# Patient Record
Sex: MALE | Race: BLACK OR AFRICAN AMERICAN | NOT HISPANIC OR LATINO | Employment: OTHER | URBAN - METROPOLITAN AREA
[De-identification: names, ages, dates, MRNs, and addresses within clinical notes are randomized per-mention and may not be internally consistent; named-entity substitution may affect disease eponyms.]

---

## 2021-05-10 ENCOUNTER — PATIENT MESSAGE (OUTPATIENT)
Dept: RESEARCH | Facility: HOSPITAL | Age: 41
End: 2021-05-10

## 2021-12-07 ENCOUNTER — OFFICE VISIT (OUTPATIENT)
Dept: URGENT CARE | Facility: CLINIC | Age: 41
End: 2021-12-07
Payer: MEDICARE

## 2021-12-07 VITALS
SYSTOLIC BLOOD PRESSURE: 150 MMHG | RESPIRATION RATE: 17 BRPM | HEIGHT: 75 IN | BODY MASS INDEX: 26.73 KG/M2 | OXYGEN SATURATION: 97 % | DIASTOLIC BLOOD PRESSURE: 97 MMHG | TEMPERATURE: 98 F | HEART RATE: 85 BPM | WEIGHT: 215 LBS

## 2021-12-07 DIAGNOSIS — R05.9 COUGH: Primary | ICD-10-CM

## 2021-12-07 LAB
CTP QC/QA: YES
CTP QC/QA: YES
POC MOLECULAR INFLUENZA A AGN: NEGATIVE
POC MOLECULAR INFLUENZA B AGN: NEGATIVE
SARS-COV-2 RDRP RESP QL NAA+PROBE: NEGATIVE

## 2021-12-07 PROCEDURE — 87502 INFLUENZA DNA AMP PROBE: CPT | Mod: QW,S$GLB,, | Performed by: FAMILY MEDICINE

## 2021-12-07 PROCEDURE — U0002: ICD-10-PCS | Mod: QW,S$GLB,, | Performed by: FAMILY MEDICINE

## 2021-12-07 PROCEDURE — 99203 PR OFFICE/OUTPT VISIT, NEW, LEVL III, 30-44 MIN: ICD-10-PCS | Mod: S$GLB,CS,, | Performed by: FAMILY MEDICINE

## 2021-12-07 PROCEDURE — 4010F PR ACE/ARB THEARPY RXD/TAKEN: ICD-10-PCS | Mod: CPTII,S$GLB,, | Performed by: FAMILY MEDICINE

## 2021-12-07 PROCEDURE — U0002 COVID-19 LAB TEST NON-CDC: HCPCS | Mod: QW,S$GLB,, | Performed by: FAMILY MEDICINE

## 2021-12-07 PROCEDURE — 4010F ACE/ARB THERAPY RXD/TAKEN: CPT | Mod: CPTII,S$GLB,, | Performed by: FAMILY MEDICINE

## 2021-12-07 PROCEDURE — 99203 OFFICE O/P NEW LOW 30 MIN: CPT | Mod: S$GLB,CS,, | Performed by: FAMILY MEDICINE

## 2021-12-07 PROCEDURE — 87502 POCT INFLUENZA A/B MOLECULAR: ICD-10-PCS | Mod: QW,S$GLB,, | Performed by: FAMILY MEDICINE

## 2021-12-07 RX ORDER — PROMETHAZINE HYDROCHLORIDE 6.25 MG/5ML
SYRUP ORAL
Qty: 120 ML | Refills: 1 | Status: SHIPPED | OUTPATIENT
Start: 2021-12-07 | End: 2024-03-18

## 2021-12-07 RX ORDER — AMOXICILLIN AND CLAVULANATE POTASSIUM 875; 125 MG/1; MG/1
1 TABLET, FILM COATED ORAL 2 TIMES DAILY
Qty: 20 TABLET | Refills: 0 | Status: SHIPPED | OUTPATIENT
Start: 2021-12-07 | End: 2021-12-17

## 2021-12-07 RX ORDER — METHYLPREDNISOLONE 4 MG/1
TABLET ORAL
Qty: 1 EACH | Refills: 0 | Status: SHIPPED | OUTPATIENT
Start: 2021-12-07 | End: 2022-06-07

## 2022-06-07 ENCOUNTER — OFFICE VISIT (OUTPATIENT)
Dept: URGENT CARE | Facility: CLINIC | Age: 42
End: 2022-06-07
Payer: MEDICARE

## 2022-06-07 VITALS
RESPIRATION RATE: 15 BRPM | HEART RATE: 64 BPM | BODY MASS INDEX: 26.73 KG/M2 | SYSTOLIC BLOOD PRESSURE: 140 MMHG | TEMPERATURE: 98 F | WEIGHT: 215 LBS | DIASTOLIC BLOOD PRESSURE: 89 MMHG | HEIGHT: 75 IN | OXYGEN SATURATION: 94 %

## 2022-06-07 DIAGNOSIS — S44.92XA NEUROPRAXIA OF LEFT UPPER EXTREMITY, INITIAL ENCOUNTER: ICD-10-CM

## 2022-06-07 DIAGNOSIS — R20.0 LEFT ARM NUMBNESS: Primary | ICD-10-CM

## 2022-06-07 DIAGNOSIS — S16.1XXA STRAIN OF NECK MUSCLE, INITIAL ENCOUNTER: ICD-10-CM

## 2022-06-07 PROCEDURE — 3077F SYST BP >= 140 MM HG: CPT | Mod: CPTII,S$GLB,, | Performed by: EMERGENCY MEDICINE

## 2022-06-07 PROCEDURE — 3079F PR MOST RECENT DIASTOLIC BLOOD PRESSURE 80-89 MM HG: ICD-10-PCS | Mod: CPTII,S$GLB,, | Performed by: EMERGENCY MEDICINE

## 2022-06-07 PROCEDURE — 1160F RVW MEDS BY RX/DR IN RCRD: CPT | Mod: CPTII,S$GLB,, | Performed by: EMERGENCY MEDICINE

## 2022-06-07 PROCEDURE — 72040 X-RAY EXAM NECK SPINE 2-3 VW: CPT | Mod: S$GLB,,, | Performed by: RADIOLOGY

## 2022-06-07 PROCEDURE — 1160F PR REVIEW ALL MEDS BY PRESCRIBER/CLIN PHARMACIST DOCUMENTED: ICD-10-PCS | Mod: CPTII,S$GLB,, | Performed by: EMERGENCY MEDICINE

## 2022-06-07 PROCEDURE — 71046 XR CHEST PA AND LATERAL: ICD-10-PCS | Mod: S$GLB,,, | Performed by: RADIOLOGY

## 2022-06-07 PROCEDURE — 72040 XR CERVICAL SPINE 2 OR 3 VIEWS: ICD-10-PCS | Mod: S$GLB,,, | Performed by: RADIOLOGY

## 2022-06-07 PROCEDURE — 99214 PR OFFICE/OUTPT VISIT, EST, LEVL IV, 30-39 MIN: ICD-10-PCS | Mod: S$GLB,,, | Performed by: EMERGENCY MEDICINE

## 2022-06-07 PROCEDURE — 71046 X-RAY EXAM CHEST 2 VIEWS: CPT | Mod: S$GLB,,, | Performed by: RADIOLOGY

## 2022-06-07 PROCEDURE — 4010F ACE/ARB THERAPY RXD/TAKEN: CPT | Mod: CPTII,S$GLB,, | Performed by: EMERGENCY MEDICINE

## 2022-06-07 PROCEDURE — 3008F PR BODY MASS INDEX (BMI) DOCUMENTED: ICD-10-PCS | Mod: CPTII,S$GLB,, | Performed by: EMERGENCY MEDICINE

## 2022-06-07 PROCEDURE — 3079F DIAST BP 80-89 MM HG: CPT | Mod: CPTII,S$GLB,, | Performed by: EMERGENCY MEDICINE

## 2022-06-07 PROCEDURE — 99214 OFFICE O/P EST MOD 30 MIN: CPT | Mod: S$GLB,,, | Performed by: EMERGENCY MEDICINE

## 2022-06-07 PROCEDURE — 3008F BODY MASS INDEX DOCD: CPT | Mod: CPTII,S$GLB,, | Performed by: EMERGENCY MEDICINE

## 2022-06-07 PROCEDURE — 3077F PR MOST RECENT SYSTOLIC BLOOD PRESSURE >= 140 MM HG: ICD-10-PCS | Mod: CPTII,S$GLB,, | Performed by: EMERGENCY MEDICINE

## 2022-06-07 PROCEDURE — 4010F PR ACE/ARB THEARPY RXD/TAKEN: ICD-10-PCS | Mod: CPTII,S$GLB,, | Performed by: EMERGENCY MEDICINE

## 2022-06-07 PROCEDURE — 1159F PR MEDICATION LIST DOCUMENTED IN MEDICAL RECORD: ICD-10-PCS | Mod: CPTII,S$GLB,, | Performed by: EMERGENCY MEDICINE

## 2022-06-07 PROCEDURE — 1159F MED LIST DOCD IN RCRD: CPT | Mod: CPTII,S$GLB,, | Performed by: EMERGENCY MEDICINE

## 2022-06-07 RX ORDER — METHYLPREDNISOLONE 4 MG/1
TABLET ORAL
Qty: 21 EACH | Refills: 0 | Status: SHIPPED | OUTPATIENT
Start: 2022-06-07 | End: 2022-06-28

## 2022-06-07 RX ORDER — CYCLOBENZAPRINE HCL 10 MG
10 TABLET ORAL 3 TIMES DAILY PRN
Qty: 21 TABLET | Refills: 0 | Status: SHIPPED | OUTPATIENT
Start: 2022-06-07

## 2022-06-07 NOTE — LETTER
[facility]  Urgent Care  [address], [address]  [facility] [address]  Phone: [phone]  Fax: [phone] June 7, 2022    Patient: Singh Wan   Patient ID [id number]   YOB: 1980   Date of Visit: 6/7/2022       To Whom It May Concern:    Singh Wan was seen and treated in our Urgent Care on 6/7/2022. He may return to work on 6/9/2022.    Sincerely,       Aly Daley MD

## 2022-06-07 NOTE — PROGRESS NOTES
"Subjective:       Patient ID: Singh Wan is a 41 y.o. male.    Vitals:  height is 6' 3" (1.905 m) and weight is 97.5 kg (215 lb). His temperature is 97.9 °F (36.6 °C). His blood pressure is 140/89 (abnormal) and his pulse is 64. His respiration is 15 and oxygen saturation is 94% (abnormal).     Chief Complaint: Numbness    Patient woke up this morning with left arm numbness.  Initially he felt that the majority of his left arm was numb radiating all the way down to all finger tips.  He says that during the course of the morning the numbness is gradually resolving.  He says now he feels numbness only on the some and index and middle fingers.  He denies chest pain.  He denies shortness of breath.  He has no fever chills.  He has no loss of motor function.  He actually denies neck pain.  Symptoms started this morning. Numbness travels down left arm. Denies heart issues.     Other  This is a new problem. The current episode started today. The problem occurs constantly. The problem has been unchanged. Nothing aggravates the symptoms. He has tried nothing for the symptoms. The treatment provided no relief.     ROS    Objective:      Physical Exam   Constitutional: He is oriented to person, place, and time. He appears well-developed. He is cooperative. No distress.   HENT:   Head: Normocephalic and atraumatic.   Nose: Nose normal.   Mouth/Throat: Oropharynx is clear and moist and mucous membranes are normal. Mucous membranes are moist. Oropharynx is clear.   Eyes: Conjunctivae and lids are normal.   Neck: Trachea normal and phonation normal. Neck supple. No neck rigidity present.   Cardiovascular: Normal rate, regular rhythm, normal heart sounds and normal pulses.   No murmur heard.  Pulmonary/Chest: Effort normal and breath sounds normal. No respiratory distress. He has no wheezes. He has no rales.   Abdominal: Normal appearance and bowel sounds are normal. He exhibits no abdominal bruit, no pulsatile midline " mass and no mass. Soft.   Musculoskeletal:         General: No deformity.   Neurological: He is alert and oriented to person, place, and time. He has normal strength and normal reflexes. No sensory deficit.      Comments: Cranial nerves 2-12 are normal.  Motor function and strength in upper and lower extremities are normal.  There is no ataxia.  Patient has partial decrease in sensation subjectively in the radial distribution.   Skin: Skin is warm, dry, intact and not diaphoretic.   Psychiatric: His speech is normal and behavior is normal. Judgment and thought content normal.   Nursing note and vitals reviewed.      EKG reveals normal sinus rhythm, LVH, no acute changes.  41-year-old woke up with left arm numbness.  The numbness is improved during the course of the morning.  Chest x-ray reveals no masses.  Cervical spine x-ray reveals reversal lordotic curvature of the C-spine.   A 2nd repeat exam reveals good sensation in all areas of the left upper extremity including the hand.  Patient's symptoms are consistent with neurapraxia and cervical strain.  Will treat with muscle relaxant and steroid Dosepak.       Assessment:       1. Left arm numbness    2. Strain of neck muscle, initial encounter    3. Neuropraxia of left upper extremity, initial encounter          Plan:         Left arm numbness  -     XR CHEST PA AND LATERAL; Future; Expected date: 06/07/2022  -     XR Cervical Spine 2 or 3 Views; Future; Expected date: 06/07/2022    Strain of neck muscle, initial encounter  -     methylPREDNISolone (MEDROL DOSEPACK) 4 mg tablet; use as directed  Dispense: 21 each; Refill: 0  -     cyclobenzaprine (FLEXERIL) 10 MG tablet; Take 1 tablet (10 mg total) by mouth 3 (three) times daily as needed for Muscle spasms.  Dispense: 21 tablet; Refill: 0    Neuropraxia of left upper extremity, initial encounter

## 2022-07-11 ENCOUNTER — OFFICE VISIT (OUTPATIENT)
Dept: URGENT CARE | Facility: CLINIC | Age: 42
End: 2022-07-11
Payer: MEDICARE

## 2022-07-11 VITALS
WEIGHT: 215 LBS | DIASTOLIC BLOOD PRESSURE: 98 MMHG | HEART RATE: 66 BPM | RESPIRATION RATE: 16 BRPM | BODY MASS INDEX: 26.73 KG/M2 | HEIGHT: 75 IN | TEMPERATURE: 100 F | SYSTOLIC BLOOD PRESSURE: 153 MMHG | OXYGEN SATURATION: 98 %

## 2022-07-11 DIAGNOSIS — U07.1 COVID-19: ICD-10-CM

## 2022-07-11 DIAGNOSIS — R09.81 CONGESTION OF NASAL SINUS: Primary | ICD-10-CM

## 2022-07-11 DIAGNOSIS — U07.1 COVID-19 VIRUS DETECTED: ICD-10-CM

## 2022-07-11 LAB
CTP QC/QA: YES
SARS-COV-2 RDRP RESP QL NAA+PROBE: POSITIVE

## 2022-07-11 PROCEDURE — 3008F PR BODY MASS INDEX (BMI) DOCUMENTED: ICD-10-PCS | Mod: CPTII,S$GLB,, | Performed by: FAMILY MEDICINE

## 2022-07-11 PROCEDURE — U0002: ICD-10-PCS | Mod: QW,S$GLB,, | Performed by: FAMILY MEDICINE

## 2022-07-11 PROCEDURE — 4010F ACE/ARB THERAPY RXD/TAKEN: CPT | Mod: CPTII,S$GLB,, | Performed by: FAMILY MEDICINE

## 2022-07-11 PROCEDURE — 99214 PR OFFICE/OUTPT VISIT, EST, LEVL IV, 30-39 MIN: ICD-10-PCS | Mod: S$GLB,,, | Performed by: FAMILY MEDICINE

## 2022-07-11 PROCEDURE — 3077F PR MOST RECENT SYSTOLIC BLOOD PRESSURE >= 140 MM HG: ICD-10-PCS | Mod: CPTII,S$GLB,, | Performed by: FAMILY MEDICINE

## 2022-07-11 PROCEDURE — 3008F BODY MASS INDEX DOCD: CPT | Mod: CPTII,S$GLB,, | Performed by: FAMILY MEDICINE

## 2022-07-11 PROCEDURE — 4010F PR ACE/ARB THEARPY RXD/TAKEN: ICD-10-PCS | Mod: CPTII,S$GLB,, | Performed by: FAMILY MEDICINE

## 2022-07-11 PROCEDURE — 3080F PR MOST RECENT DIASTOLIC BLOOD PRESSURE >= 90 MM HG: ICD-10-PCS | Mod: CPTII,S$GLB,, | Performed by: FAMILY MEDICINE

## 2022-07-11 PROCEDURE — 3077F SYST BP >= 140 MM HG: CPT | Mod: CPTII,S$GLB,, | Performed by: FAMILY MEDICINE

## 2022-07-11 PROCEDURE — 3080F DIAST BP >= 90 MM HG: CPT | Mod: CPTII,S$GLB,, | Performed by: FAMILY MEDICINE

## 2022-07-11 PROCEDURE — 99214 OFFICE O/P EST MOD 30 MIN: CPT | Mod: S$GLB,,, | Performed by: FAMILY MEDICINE

## 2022-07-11 PROCEDURE — U0002 COVID-19 LAB TEST NON-CDC: HCPCS | Mod: QW,S$GLB,, | Performed by: FAMILY MEDICINE

## 2022-07-11 RX ORDER — PROMETHAZINE HYDROCHLORIDE 6.25 MG/5ML
SYRUP ORAL
Qty: 120 ML | Refills: 1 | Status: SHIPPED | OUTPATIENT
Start: 2022-07-11 | End: 2024-03-18

## 2022-07-11 RX ORDER — BENZONATATE 100 MG/1
100 CAPSULE ORAL EVERY 6 HOURS PRN
Qty: 30 CAPSULE | Refills: 1 | Status: SHIPPED | OUTPATIENT
Start: 2022-07-11 | End: 2023-07-11

## 2022-07-11 NOTE — PROGRESS NOTES
"Subjective:       Patient ID: Singh Wan is a 41 y.o. male.    Vitals:  height is 6' 3" (1.905 m) and weight is 97.5 kg (215 lb). His oral temperature is 99.5 °F (37.5 °C). His blood pressure is 153/98 (abnormal) and his pulse is 66. His respiration is 16 and oxygen saturation is 98%.     Chief Complaint: Nasal Congestion    Pt presents with body aches, nasal congestion, sore throat, weakness x2 days. Pt not Covid vacs and no known exposure. OTC not taken and pain scale 6 /10.    Other  This is a new problem. The current episode started in the past 7 days. The problem occurs constantly. The problem has been unchanged. Associated symptoms include congestion and fatigue. Nothing aggravates the symptoms. He has tried nothing for the symptoms.       Constitution: Positive for fatigue.   HENT: Positive for congestion.        Objective:      Physical Exam      Physical Exam  Vitals signs and nursing note reviewed.   Constitutional:       Appearance: Pt is well-developed. Alert, NAD  HENT:      Head: Normocephalic and atraumatic. Pt appears well-developed and well-nourished. Pt is cooperative.  Non-toxic appearance. Pt does not have a sickly appearance. Pt does not appear ill. No distress     Right Ear: External ear normal. external ear and ear canal normal.      Left Ear: External ear normal. external ear and ear canal normal.   Eyes:      General: Lids are normal.      Conjunctiva/sclera: Conjunctivae normal. Visual tracking is normal. Right eye exhibits no exudate. Left eye exhibits no exudate. No scleral icterus.     Pupils: Pupils are equal, round  Neck:      Musculoskeletal: Full passive range of motion without pain and neck supple.      Trachea: Trachea and phonation normal.   Cardiovascular:      Rate and Rhythm: Normal rate. Extremities well perfused.   Pulmonary:      Effort: Pulmonary effort is normal. No respiratory distress.     Breath sounds: Normal breath sounds.   Abdomen: NO obvious " distention.  Musculoskeletal: Normal range of motion. No ambulation issues  Skin:     General: Skin is warm and dry. No open wounds or abrasions. No petechiae No cyanosis  no jaundice not diaphoretic, not pale, not purpuric  Neurological:      Mental Status:Pt is alert and oriented to person, place, and time.   Psychiatric:         Speech: Speech normal.         Behavior: Behavior normal.         Thought Content: Thought content normal.         Judgment: Judgment normal.         Assessment:       1. Congestion of nasal sinus    2. COVID-19          Plan:     r/b paxlovid d/w pt- Lizbeth  used for ASL.     Congestion of nasal sinus  -     POCT COVID-19 Rapid Screening    COVID-19    Other orders  -     promethazine (PHENERGAN) 6.25 mg/5 mL syrup; 10mL at night as needed  Dispense: 120 mL; Refill: 1  -     benzonatate (TESSALON PERLES) 100 MG capsule; Take 1 capsule (100 mg total) by mouth every 6 (six) hours as needed for Cough.  Dispense: 30 capsule; Refill: 1  -     nirmatrelvir-ritonavir 150 mg x 2- 100 mg copackaged tablets (EUA); Take 3 tablets by mouth 2 (two) times daily for 5 days. Each dose contains 2 nirmatrelvir (pink tablets) and 1 ritonavir (white tablet). Take all 3 tablets together  Dispense: 30 tablet; Refill: 0         2-covid risk score

## 2023-06-07 ENCOUNTER — OFFICE VISIT (OUTPATIENT)
Dept: URGENT CARE | Facility: CLINIC | Age: 43
End: 2023-06-07
Payer: MEDICARE

## 2023-06-07 VITALS
TEMPERATURE: 99 F | SYSTOLIC BLOOD PRESSURE: 165 MMHG | DIASTOLIC BLOOD PRESSURE: 95 MMHG | OXYGEN SATURATION: 98 % | WEIGHT: 216.06 LBS | BODY MASS INDEX: 26.86 KG/M2 | HEIGHT: 75 IN | RESPIRATION RATE: 18 BRPM | HEART RATE: 69 BPM

## 2023-06-07 DIAGNOSIS — R05.9 COUGH, UNSPECIFIED TYPE: Primary | ICD-10-CM

## 2023-06-07 DIAGNOSIS — R09.81 SINUS CONGESTION: ICD-10-CM

## 2023-06-07 PROCEDURE — 99213 PR OFFICE/OUTPT VISIT, EST, LEVL III, 20-29 MIN: ICD-10-PCS | Mod: S$GLB,,, | Performed by: FAMILY MEDICINE

## 2023-06-07 PROCEDURE — 99213 OFFICE O/P EST LOW 20 MIN: CPT | Mod: S$GLB,,, | Performed by: FAMILY MEDICINE

## 2023-06-07 RX ORDER — AMOXICILLIN AND CLAVULANATE POTASSIUM 875; 125 MG/1; MG/1
1 TABLET, FILM COATED ORAL 2 TIMES DAILY
Qty: 20 TABLET | Refills: 0 | Status: SHIPPED | OUTPATIENT
Start: 2023-06-07 | End: 2023-06-17

## 2023-06-07 RX ORDER — PROMETHAZINE HYDROCHLORIDE 6.25 MG/5ML
SYRUP ORAL
Qty: 120 ML | Refills: 1 | Status: SHIPPED | OUTPATIENT
Start: 2023-06-07 | End: 2024-03-18

## 2023-06-07 RX ORDER — METHYLPREDNISOLONE 4 MG/1
TABLET ORAL
Qty: 1 EACH | Refills: 0 | Status: SHIPPED | OUTPATIENT
Start: 2023-06-07 | End: 2024-03-18

## 2023-06-07 NOTE — PROGRESS NOTES
"Subjective:      Patient ID: Singh Wan is a 42 y.o. male.    Vitals:  height is 6' 3" (1.905 m) and weight is 98 kg (216 lb 0.8 oz). His temperature is 98.6 °F (37 °C). His blood pressure is 165/95 (abnormal) and his pulse is 69. His respiration is 18 and oxygen saturation is 98%.     Chief Complaint: Cough    Patient reports having cough and nasal congestion for several days. He has not tried any treatment medications. He does have mild pain 2/10.     Cough  This is a new problem. The current episode started yesterday. The problem has been unchanged. The problem occurs constantly. The cough is Non-productive. Associated symptoms include nasal congestion and postnasal drip. Nothing aggravates the symptoms. He has tried nothing for the symptoms. The treatment provided no relief.     HENT:  Positive for postnasal drip.    Respiratory:  Positive for cough.     Objective:     Physical Exam    Physical Exam  Vitals signs and nursing note reviewed.   Constitutional:       Appearance: Pt is well-developed. Alert, NAD.  Pt is cooperative.  Non-toxic appearance.  HENT:      Head: Normocephalic and atraumatic. .      Right Ear: External ear normal.      Left Ear: External ear normal.   Eyes:      General: Lids are normal.      Conjunctiva/sclera: Conjunctivae normal. Visual tracking is normal. Right eye exhibits no exudate. Left eye exhibits no exudate. No scleral icterus.     Pupils: Pupils are equal, round  Neck:      Musculoskeletal: range of motion without pain and neck supple.      Trachea: Trachea and phonation normal.   Throat: No apparent pharyngeal edema or swelling  Cardiovascular:      Rate and Rhythm: Normal Rhythm. Extremities well perfused.   Pulmonary:      Effort: Pulmonary effort is normal. No respiratory distress. NO stridor or difficulty breathing     Breath sounds: Normal breath sounds.   Abdomen: NO obvious distention.  Musculoskeletal: Normal range of motion. No ambulation issues  Skin:     " General: Skin is warm and dry. No open wounds or abrasions. No petechiae No cyanosis  no jaundice not diaphoretic, not pale, not purpuric  Neurological:      Mental Status:Pt is alert and oriented to person, place, and time.   Psychiatric:         Speech: Speech normal.         Behavior: Behavior normal.         Thought Content: Thought content normal.         Judgment: Judgment normal.       Assessment:     1. Cough, unspecified type    2. Sinus congestion        Plan:   BOB translation used for ASL  Pocket script given to patient in case symptoms fail to improve or worsen. Pt advised on risk of taking medication too soon and verbalized understanding.    Explained r/b and patient v/u to fill only if symptoms progress or worsen after maximizing home remedies sheet given and/or explained during encounter.     Cough, unspecified type    Sinus congestion    Other orders  -     amoxicillin-clavulanate 875-125mg (AUGMENTIN) 875-125 mg per tablet; Take 1 tablet by mouth 2 (two) times daily. for 10 days  Dispense: 20 tablet; Refill: 0  -     methylPREDNISolone (MEDROL DOSEPACK) 4 mg tablet; Dispense one carlos. use as directed  Dispense: 1 each; Refill: 0  -     promethazine (PHENERGAN) 6.25 mg/5 mL syrup; 10mL at night as needed  Dispense: 120 mL; Refill: 1

## 2023-06-15 ENCOUNTER — OCCUPATIONAL HEALTH (OUTPATIENT)
Dept: URGENT CARE | Facility: CLINIC | Age: 43
End: 2023-06-15

## 2023-06-15 DIAGNOSIS — Z02.83 ENCOUNTER FOR DRUG SCREENING: Primary | ICD-10-CM

## 2023-06-15 DIAGNOSIS — Z13.9 ENCOUNTER FOR SCREENING: ICD-10-CM

## 2023-06-15 LAB
CTP QC/QA: YES
POC 10 PANEL DRUG SCREEN: ABNORMAL

## 2023-06-15 PROCEDURE — 80305 OOH NON-DOT DRUG SCREEN: ICD-10-PCS | Mod: S$GLB,,, | Performed by: PHYSICIAN ASSISTANT

## 2023-06-15 PROCEDURE — 80305 DRUG TEST PRSMV DIR OPT OBS: CPT | Mod: S$GLB,,, | Performed by: PHYSICIAN ASSISTANT

## 2023-06-15 PROCEDURE — 80305 DRUG TEST PRSMV DIR OPT OBS: CPT | Mod: S$GLB,,, | Performed by: PEDIATRICS

## 2023-06-15 PROCEDURE — 80305 POCT RAPID DRUG SCREEN 10 PANEL: ICD-10-PCS | Mod: S$GLB,,, | Performed by: PEDIATRICS

## 2023-10-11 ENCOUNTER — OFFICE VISIT (OUTPATIENT)
Dept: URGENT CARE | Facility: CLINIC | Age: 43
End: 2023-10-11
Payer: MEDICARE

## 2023-10-11 VITALS
BODY MASS INDEX: 29.84 KG/M2 | TEMPERATURE: 98 F | HEART RATE: 67 BPM | OXYGEN SATURATION: 96 % | HEIGHT: 75 IN | SYSTOLIC BLOOD PRESSURE: 138 MMHG | WEIGHT: 240 LBS | DIASTOLIC BLOOD PRESSURE: 91 MMHG

## 2023-10-11 DIAGNOSIS — R06.2 WHEEZING: ICD-10-CM

## 2023-10-11 DIAGNOSIS — R09.89 CHEST CONGESTION: ICD-10-CM

## 2023-10-11 DIAGNOSIS — R52 BODY ACHES: Primary | ICD-10-CM

## 2023-10-11 LAB
CTP QC/QA: YES
POC MOLECULAR INFLUENZA A AGN: NEGATIVE
POC MOLECULAR INFLUENZA B AGN: NEGATIVE

## 2023-10-11 PROCEDURE — 87502 INFLUENZA DNA AMP PROBE: CPT | Mod: QW,S$GLB,, | Performed by: PHYSICIAN ASSISTANT

## 2023-10-11 PROCEDURE — 99213 PR OFFICE/OUTPT VISIT, EST, LEVL III, 20-29 MIN: ICD-10-PCS | Mod: S$GLB,,, | Performed by: PHYSICIAN ASSISTANT

## 2023-10-11 PROCEDURE — 71046 X-RAY EXAM CHEST 2 VIEWS: CPT | Mod: S$GLB,,, | Performed by: RADIOLOGY

## 2023-10-11 PROCEDURE — 71046 XR CHEST PA AND LATERAL: ICD-10-PCS | Mod: S$GLB,,, | Performed by: RADIOLOGY

## 2023-10-11 PROCEDURE — 87502 POCT INFLUENZA A/B MOLECULAR: ICD-10-PCS | Mod: QW,S$GLB,, | Performed by: PHYSICIAN ASSISTANT

## 2023-10-11 PROCEDURE — 99213 OFFICE O/P EST LOW 20 MIN: CPT | Mod: S$GLB,,, | Performed by: PHYSICIAN ASSISTANT

## 2023-10-11 RX ORDER — ACYCLOVIR 50 MG/G
OINTMENT TOPICAL
Qty: 5 G | Refills: 0 | Status: SHIPPED | OUTPATIENT
Start: 2023-10-11

## 2023-10-11 RX ORDER — PROMETHAZINE HYDROCHLORIDE AND DEXTROMETHORPHAN HYDROBROMIDE 6.25; 15 MG/5ML; MG/5ML
5 SYRUP ORAL EVERY 4 HOURS PRN
Qty: 240 ML | Refills: 0 | Status: SHIPPED | OUTPATIENT
Start: 2023-10-11 | End: 2023-10-21

## 2023-10-11 RX ORDER — IPRATROPIUM BROMIDE 21 UG/1
2 SPRAY, METERED NASAL 3 TIMES DAILY
Qty: 30 ML | Refills: 0 | Status: SHIPPED | OUTPATIENT
Start: 2023-10-11

## 2023-10-11 RX ORDER — ALBUTEROL SULFATE 90 UG/1
2 AEROSOL, METERED RESPIRATORY (INHALATION) EVERY 6 HOURS PRN
Qty: 18 G | Refills: 0 | Status: SHIPPED | OUTPATIENT
Start: 2023-10-11

## 2023-10-11 NOTE — PROGRESS NOTES
"Subjective:      Patient ID: Singh Wan is a 43 y.o. male.    Vitals:  height is 6' 3" (1.905 m) and weight is 108.9 kg (240 lb). His temporal temperature is 98.2 °F (36.8 °C). His blood pressure is 138/91 (abnormal) and his pulse is 67. His oxygen saturation is 96%.     Chief Complaint: Generalized Body Aches and Nasal Congestion    Patient presents today with complaints of sinus congestion, cough & body aches x 4 days. Fever blister on left side of lip since last night. Patient has been taking Robitussin & Tylenol OTC with no relief. Patient does NOT want to be tested for Covid, however he DOES want to be tested for Flu.     Sinus Problem  This is a new problem. The current episode started in the past 7 days. There has been no fever. His pain is at a severity of 9/10. Associated symptoms include chills, congestion and coughing. Pertinent negatives include no diaphoresis, ear pain, headaches, neck pain, shortness of breath, sneezing or sore throat.       Constitution: Positive for chills and generalized weakness. Negative for sweating, fatigue and fever.   HENT:  Positive for mouth sores, congestion and postnasal drip. Negative for ear pain, drooling, sore throat, trouble swallowing and voice change.    Neck: Negative for neck pain, neck stiffness and painful lymph nodes.   Cardiovascular:  Negative for chest pain, leg swelling, palpitations, sob on exertion and passing out.   Eyes:  Negative for eye discharge, eye itching, eye pain, eye redness and eyelid swelling.   Respiratory:  Positive for cough. Negative for chest tightness, sputum production, bloody sputum, shortness of breath, stridor and wheezing.    Gastrointestinal:  Negative for abdominal pain, abdominal bloating, nausea, vomiting, constipation, diarrhea and heartburn.   Genitourinary:  Negative for urine decreased.   Musculoskeletal:  Negative for joint pain, joint swelling, abnormal ROM of joint, pain with walking, muscle cramps and muscle " ache.   Skin:  Negative for rash and hives.   Allergic/Immunologic: Negative for hives, itching and sneezing.   Neurological:  Negative for dizziness, light-headedness, passing out, loss of balance, headaches, altered mental status, loss of consciousness, numbness and seizures.   Hematologic/Lymphatic: Negative for swollen lymph nodes.   Psychiatric/Behavioral:  Negative for altered mental status and nervous/anxious. The patient is not nervous/anxious.       Objective:     Physical Exam   Constitutional: He is oriented to person, place, and time. He appears well-developed. He is cooperative.  Non-toxic appearance. He does not appear ill. No distress.   HENT:   Head: Normocephalic and atraumatic.   Ears:   Right Ear: Hearing, tympanic membrane, external ear and ear canal normal.   Left Ear: Hearing, tympanic membrane, external ear and ear canal normal.   Nose: Mucosal edema and rhinorrhea present. No nasal deformity. No epistaxis. Right sinus exhibits no maxillary sinus tenderness and no frontal sinus tenderness. Left sinus exhibits no maxillary sinus tenderness and no frontal sinus tenderness.   Mouth/Throat: Uvula is midline and mucous membranes are normal. No trismus in the jaw. Normal dentition. No uvula swelling. Posterior oropharyngeal erythema and cobblestoning present. No oropharyngeal exudate, posterior oropharyngeal edema or tonsillar abscesses. No tonsillar exudate.   Eyes: Conjunctivae and lids are normal. No scleral icterus.   Neck: Trachea normal and phonation normal. Neck supple. No edema present. No erythema present. No neck rigidity present.   Cardiovascular: Normal rate, regular rhythm, normal heart sounds and normal pulses.   Pulmonary/Chest: Effort normal. No accessory muscle usage or stridor. No respiratory distress. He has no decreased breath sounds. He has wheezes. He has no rhonchi. He has no rales.   Abdominal: Normal appearance.   Musculoskeletal: Normal range of motion.         General: No  deformity. Normal range of motion.   Lymphadenopathy:     He has no cervical adenopathy.   Neurological: He is alert and oriented to person, place, and time. He has normal sensation. He exhibits normal muscle tone. Gait normal. Coordination normal.   Skin: Skin is warm, dry, intact, not diaphoretic, not pale and no rash. Capillary refill takes less than 2 seconds.   Psychiatric: His speech is normal and behavior is normal. Judgment and thought content normal.   Nursing note and vitals reviewed.    XR CHEST PA AND LATERAL    Result Date: 10/11/2023  EXAMINATION: XR CHEST PA AND LATERAL CLINICAL HISTORY: Wheezing TECHNIQUE: PA and lateral views of the chest were performed. COMPARISON: 06/07/2022 FINDINGS: The cardiomediastinal silhouette is within normal limits.  The lungs are well expanded without consolidation or pleural effusion.     Negative chest.  No significant change. Electronically signed by: Timo Church MD Date:    10/11/2023 Time:    11:12    Assessment:     1. Body aches    2. Chest congestion    3. Wheezing        Plan:       Body aches  -     POCT Influenza A/B MOLECULAR    Chest congestion    Wheezing  -     XR CHEST PA AND LATERAL; Future; Expected date: 10/11/2023    Other orders  -     promethazine-dextromethorphan (PROMETHAZINE-DM) 6.25-15 mg/5 mL Syrp; Take 5 mLs by mouth every 4 (four) hours as needed (cough).  Dispense: 240 mL; Refill: 0  -     ipratropium (ATROVENT) 21 mcg (0.03 %) nasal spray; 2 sprays by Each Nostril route 3 (three) times daily.  Dispense: 30 mL; Refill: 0  -     acyclovir 5% (ZOVIRAX) 5 % ointment; Apply topically 6 (six) times daily.  Dispense: 5 g; Refill: 0  -     albuterol (VENTOLIN HFA) 90 mcg/actuation inhaler; Inhale 2 puffs into the lungs every 6 (six) hours as needed for Wheezing. Rescue  Dispense: 18 g; Refill: 0        Patient Instructions   Use an antihistamine such as Claritin, Zyrtec or Allegra to dry you out.     Use pseudoephedrine (behind the counter)  to decongest. Pseudoephedrine  30 mg up to 240 mg /day. It can raise your blood pressure and give you palpitations.    Use mucinex (guaifenisin) to break up mucous up to 2400mg/day to loosen any mucous. The mucinex DM pill has a cough suppressant that can be used at night to stop the tickle at the back of your throat.    Use Nasal Saline to mechanically move any post nasal drip from your eustachian tube or from the back of your throat.    Use Afrin in each nare for no longer than 3 days, as it is addictive. It can also dry out your mucous membranes and cause elevated blood pressure.    Use Flonase 1-2 sprays/nostril per day. It is a local acting steroid nasal spray, if you develop a bloody nose, stop using the medication immediately.    Use warm salt water gargles to ease your throat pain. Warm salt water gargles as needed for sore throat-  1/2 tsp salt to 1 cup warm water, gargle as desired.    Sometimes Nyquil at night is beneficial to help you get some rest, however it is sedating and it does have an antihistamine, and tylenol.     You must understand that you've received an Urgent Care treatment only and that you may be released before all your medical problems are known or treated. You, the patient, will arrange for follow up care as instructed.  Follow up with your PCP or specialty clinic as directed if not improved or as needed. You can call [phone] to schedule an appointment with the appropriate provider.  If your condition worsens we recommend that you receive another evaluation at the Emergency Department for any concerns or worsening of condition.  Patient aware and verbalized understanding.

## 2023-11-08 ENCOUNTER — HOSPITAL ENCOUNTER (OUTPATIENT)
Dept: RADIOLOGY | Facility: HOSPITAL | Age: 43
Discharge: HOME | End: 2023-11-08
Attending: ORTHOPAEDIC SURGERY
Payer: MEDICARE

## 2023-11-08 ENCOUNTER — OFFICE VISIT (OUTPATIENT)
Dept: ORTHOPEDICS | Facility: CLINIC | Age: 43
End: 2023-11-08
Payer: MEDICARE

## 2023-11-08 DIAGNOSIS — S62.632B OPEN DISPLACED FRACTURE OF DISTAL PHALANX OF RIGHT MIDDLE FINGER, INITIAL ENCOUNTER: ICD-10-CM

## 2023-11-08 DIAGNOSIS — S62.632B OPEN DISPLACED FRACTURE OF DISTAL PHALANX OF RIGHT MIDDLE FINGER, INITIAL ENCOUNTER: Primary | ICD-10-CM

## 2023-11-08 PROCEDURE — 73140 XR FINGER 2 OR MORE VIEWS RIGHT: ICD-10-PCS | Mod: 26,RT,, | Performed by: RADIOLOGY

## 2023-11-08 PROCEDURE — 99204 OFFICE O/P NEW MOD 45 MIN: CPT | Mod: S$GLB,,, | Performed by: ORTHOPAEDIC SURGERY

## 2023-11-08 PROCEDURE — 99204 PR OFFICE/OUTPT VISIT, NEW, LEVL IV, 45-59 MIN: ICD-10-PCS | Mod: S$GLB,,, | Performed by: ORTHOPAEDIC SURGERY

## 2023-11-08 PROCEDURE — 73140 X-RAY EXAM OF FINGER(S): CPT | Mod: 26,RT,, | Performed by: RADIOLOGY

## 2023-11-08 PROCEDURE — 99999 PR PBB SHADOW E&M-EST. PATIENT-LVL III: ICD-10-PCS | Mod: PBBFAC,,, | Performed by: ORTHOPAEDIC SURGERY

## 2023-11-08 PROCEDURE — 1159F MED LIST DOCD IN RCRD: CPT | Mod: CPTII,S$GLB,, | Performed by: ORTHOPAEDIC SURGERY

## 2023-11-08 PROCEDURE — 1159F PR MEDICATION LIST DOCUMENTED IN MEDICAL RECORD: ICD-10-PCS | Mod: CPTII,S$GLB,, | Performed by: ORTHOPAEDIC SURGERY

## 2023-11-08 PROCEDURE — 99999 PR PBB SHADOW E&M-EST. PATIENT-LVL III: CPT | Mod: PBBFAC,,, | Performed by: ORTHOPAEDIC SURGERY

## 2023-11-08 PROCEDURE — 73140 X-RAY EXAM OF FINGER(S): CPT | Mod: TC,PO,RT

## 2023-11-08 PROCEDURE — 4010F PR ACE/ARB THEARPY RXD/TAKEN: ICD-10-PCS | Mod: CPTII,S$GLB,, | Performed by: ORTHOPAEDIC SURGERY

## 2023-11-08 PROCEDURE — 4010F ACE/ARB THERAPY RXD/TAKEN: CPT | Mod: CPTII,S$GLB,, | Performed by: ORTHOPAEDIC SURGERY

## 2023-11-08 NOTE — PROGRESS NOTES
"11/8/2023    Chief Complaint:  Chief Complaint   Patient presents with    Right Hand - Injury, Pain       HPI:  Singh Wan is a 43 y.o. male, who presents to clinic today has a history of a  injury to his right middle finger.  This occurred approximately 5 days ago.  He was seen in the ER where it was irrigated and stitched closed.  He is here today for further evaluation    PMHX:  Past Medical History:   Diagnosis Date    a Bilateral Congenital Deafness     On Disability/Medicare For This; His Father Is Also Deaf    a H/O Atypical CP With 3/17/16 LCST = Normal     a Recurrent Palpitations     4/4/16 Referred To Dr. Mitch vazquez Family H/O DM     Depression     e Family H/O Thyroid Disorder     Hypertension     i 1 PPD X 15 YRs Tobacco Use     i PPD POS CXR NEG TXd 2010 With INH X 6 Months     3/11/16 "Stop Smoking"    j GERD     3/11/16 RXd Protonix 40 Mg Daily    l Chronic Low Back Pain     4/4/16 Referred To Dr. Zach roblero Right Hand/Wrist Neuropathy     n Anxiety And Depression     n Benzodiazepine Habituation     Until carissa Sees Psychiatry, No More Than 40 Xanax Tablets Per Month    q BLE Varicose Veins     3/11/16 Referred To Dr. Chivo Elliott    Wellness Visit 3/11/16        PSHX:  Past Surgical History:   Procedure Laterality Date    WRIST SURGERY Right        FMHX:  Family History   Problem Relation Age of Onset    Asthma Father     Lung disease Father        SOCHX:  Social History     Tobacco Use    Smoking status: Not on file     Passive exposure: Current    Smokeless tobacco: Never   Substance Use Topics    Alcohol use: Yes     Alcohol/week: 4.0 standard drinks of alcohol     Types: 4 Cans of beer per week       ALLERGIES:  Patient has no known allergies.    CURRENT MEDICATIONS:  Current Outpatient Medications on File Prior to Visit   Medication Sig Dispense Refill    acyclovir 5% (ZOVIRAX) 5 % ointment Apply topically 6 (six) times daily. 5 g 0    albuterol " (VENTOLIN HFA) 90 mcg/actuation inhaler Inhale 2 puffs into the lungs every 6 (six) hours as needed for Wheezing. Rescue 18 g 0    alprazolam (XANAX) 1 MG tablet Take 1 tablet (1 mg total) by mouth 2 (two) times daily as needed for Anxiety. 40 tablet 1    cyclobenzaprine (FLEXERIL) 10 MG tablet Take 1 tablet (10 mg total) by mouth 3 (three) times daily as needed for Muscle spasms. 21 tablet 0    fluoxetine (PROZAC) 20 MG capsule Take 1 capsule by mouth Daily.      HYDROcodone-acetaminophen (NORCO) 5-325 mg per tablet Take 1 tablet by mouth every 6 (six) hours as needed for Pain. 20 tablet 0    hydrocodone-acetaminophen 5-325mg (NORCO) 5-325 mg per tablet Take 1 tablet by mouth every 4 (four) hours as needed for Pain. 18 tablet 0    ibuprofen (ADVIL,MOTRIN) 600 MG tablet Take 1 tablet (600 mg total) by mouth every 6 (six) hours as needed for Pain. 20 tablet 0    ipratropium (ATROVENT) 21 mcg (0.03 %) nasal spray 2 sprays by Each Nostril route 3 (three) times daily. 30 mL 0    losartan (COZAAR) 50 MG tablet Take 1 tablet by mouth Daily.      meloxicam (MOBIC) 15 MG tablet Take 1 tablet (15 mg total) by mouth daily as needed for Pain. 30 tablet 6    methylPREDNISolone (MEDROL DOSEPACK) 4 mg tablet Dispense one carlos. use as directed 1 each 0    mirtazapine (REMERON) 15 MG tablet Take 1 tablet by mouth Daily.      paroxetine (PAXIL) 20 MG tablet Take 1 tablet by mouth Daily.      promethazine (PHENERGAN) 6.25 mg/5 mL syrup 10mL at night as needed 120 mL 1    promethazine (PHENERGAN) 6.25 mg/5 mL syrup 10mL at night as needed 120 mL 1    promethazine (PHENERGAN) 6.25 mg/5 mL syrup 10mL at night as needed 120 mL 1    sulfamethoxazole-trimethoprim 800-160mg (BACTRIM DS) 800-160 mg Tab Take 1 tablet by mouth 2 (two) times daily. for 7 days 14 tablet 0    [DISCONTINUED] hydrocodone-acetaminophen 10-325mg (NORCO)  mg Tab Take 1 tablet by mouth 3 (three) times daily as needed.  0    lisinopril (PRINIVIL,ZESTRIL) 5 MG  tablet Take 1 tablet (5 mg total) by mouth once daily. 90 tablet 3    pantoprazole (PROTONIX) 40 MG tablet Take 1 tablet (40 mg total) by mouth once daily. 90 tablet 1     No current facility-administered medications on file prior to visit.       REVIEW OF SYSTEMS:  ROS    GENERAL PHYSICAL EXAM:   There were no vitals taken for this visit.   GEN: well developed, well nourished, no acute distress   HENT: Normocephalic, atraumatic   EYES: No discharge, conjunctiva normal   NECK: Supple, non-tender   PULM: No wheezing, no respiratory distress   CV: RRR   ABD: Soft, non-tender    ORTHO EXAM:   Examination the right hand and middle finger reveals that there is a laceration through the base of the nail bed and through the finger which accounts for approximately 80% of the circumference.  The tip of the finger remains pink.  There are sutures in place.  There is a small deformity to the tip.  He has capillary refill less than 2 seconds at the tip    RADIOLOGY:   X-rays of the right middle finger taken in clinic today there is noted to be a comminuted fracture of the distal phalanx.  This has only minimal displacement.    ASSESSMENT:   Right middle finger  injury with open distal phalanx fracture    PLAN:  1. His wounds were cleaned today and a fresh bulky dressing was placed     2. He will continue limited weight-bearing    3.  Will follow up in 10 days with repeat x-ray of the right middle finger out of the dressing at which point I will discuss the timing of removing the sutures.    4.  He will finish the full 7 days of antibiotics

## 2023-11-09 DIAGNOSIS — S62.632B OPEN DISPLACED FRACTURE OF DISTAL PHALANX OF RIGHT MIDDLE FINGER, INITIAL ENCOUNTER: Primary | ICD-10-CM

## 2023-11-17 ENCOUNTER — OFFICE VISIT (OUTPATIENT)
Dept: ORTHOPEDICS | Facility: CLINIC | Age: 43
End: 2023-11-17
Payer: MEDICARE

## 2023-11-17 ENCOUNTER — HOSPITAL ENCOUNTER (OUTPATIENT)
Dept: RADIOLOGY | Facility: HOSPITAL | Age: 43
Discharge: HOME | End: 2023-11-17
Attending: PHYSICIAN ASSISTANT
Payer: MEDICARE

## 2023-11-17 VITALS — WEIGHT: 240 LBS | HEIGHT: 75 IN | BODY MASS INDEX: 29.84 KG/M2

## 2023-11-17 DIAGNOSIS — S62.632B OPEN DISPLACED FRACTURE OF DISTAL PHALANX OF RIGHT MIDDLE FINGER, INITIAL ENCOUNTER: ICD-10-CM

## 2023-11-17 DIAGNOSIS — S62.632D OPEN DISPLACED FRACTURE OF DISTAL PHALANX OF RIGHT MIDDLE FINGER WITH ROUTINE HEALING, SUBSEQUENT ENCOUNTER: Primary | ICD-10-CM

## 2023-11-17 PROCEDURE — 1159F MED LIST DOCD IN RCRD: CPT | Mod: CPTII,S$GLB,, | Performed by: PHYSICIAN ASSISTANT

## 2023-11-17 PROCEDURE — 73140 X-RAY EXAM OF FINGER(S): CPT | Mod: TC,PO,RT

## 2023-11-17 PROCEDURE — 1160F RVW MEDS BY RX/DR IN RCRD: CPT | Mod: CPTII,S$GLB,, | Performed by: PHYSICIAN ASSISTANT

## 2023-11-17 PROCEDURE — 1160F PR REVIEW ALL MEDS BY PRESCRIBER/CLIN PHARMACIST DOCUMENTED: ICD-10-PCS | Mod: CPTII,S$GLB,, | Performed by: PHYSICIAN ASSISTANT

## 2023-11-17 PROCEDURE — 4010F PR ACE/ARB THEARPY RXD/TAKEN: ICD-10-PCS | Mod: CPTII,S$GLB,, | Performed by: PHYSICIAN ASSISTANT

## 2023-11-17 PROCEDURE — 73140 XR FINGER 2 OR MORE VIEWS RIGHT: ICD-10-PCS | Mod: 26,RT,, | Performed by: RADIOLOGY

## 2023-11-17 PROCEDURE — 73140 X-RAY EXAM OF FINGER(S): CPT | Mod: 26,RT,, | Performed by: RADIOLOGY

## 2023-11-17 PROCEDURE — 99999 PR PBB SHADOW E&M-EST. PATIENT-LVL III: CPT | Mod: PBBFAC,,, | Performed by: PHYSICIAN ASSISTANT

## 2023-11-17 PROCEDURE — 99213 PR OFFICE/OUTPT VISIT, EST, LEVL III, 20-29 MIN: ICD-10-PCS | Mod: S$GLB,,, | Performed by: PHYSICIAN ASSISTANT

## 2023-11-17 PROCEDURE — 4010F ACE/ARB THERAPY RXD/TAKEN: CPT | Mod: CPTII,S$GLB,, | Performed by: PHYSICIAN ASSISTANT

## 2023-11-17 PROCEDURE — 1159F PR MEDICATION LIST DOCUMENTED IN MEDICAL RECORD: ICD-10-PCS | Mod: CPTII,S$GLB,, | Performed by: PHYSICIAN ASSISTANT

## 2023-11-17 PROCEDURE — 99999 PR PBB SHADOW E&M-EST. PATIENT-LVL III: ICD-10-PCS | Mod: PBBFAC,,, | Performed by: PHYSICIAN ASSISTANT

## 2023-11-17 PROCEDURE — 3008F PR BODY MASS INDEX (BMI) DOCUMENTED: ICD-10-PCS | Mod: CPTII,S$GLB,, | Performed by: PHYSICIAN ASSISTANT

## 2023-11-17 PROCEDURE — 3008F BODY MASS INDEX DOCD: CPT | Mod: CPTII,S$GLB,, | Performed by: PHYSICIAN ASSISTANT

## 2023-11-17 PROCEDURE — 99213 OFFICE O/P EST LOW 20 MIN: CPT | Mod: S$GLB,,, | Performed by: PHYSICIAN ASSISTANT

## 2023-11-22 DIAGNOSIS — S62.632D OPEN DISPLACED FRACTURE OF DISTAL PHALANX OF RIGHT MIDDLE FINGER WITH ROUTINE HEALING, SUBSEQUENT ENCOUNTER: Primary | ICD-10-CM

## 2023-11-27 ENCOUNTER — HOSPITAL ENCOUNTER (OUTPATIENT)
Dept: RADIOLOGY | Facility: HOSPITAL | Age: 43
Discharge: HOME | End: 2023-11-27
Attending: PHYSICIAN ASSISTANT
Payer: MEDICARE

## 2023-11-27 ENCOUNTER — OFFICE VISIT (OUTPATIENT)
Dept: URGENT CARE | Facility: CLINIC | Age: 43
End: 2023-11-27
Payer: MEDICARE

## 2023-11-27 ENCOUNTER — OFFICE VISIT (OUTPATIENT)
Dept: ORTHOPEDICS | Facility: CLINIC | Age: 43
End: 2023-11-27
Payer: MEDICARE

## 2023-11-27 VITALS
DIASTOLIC BLOOD PRESSURE: 88 MMHG | RESPIRATION RATE: 20 BRPM | HEIGHT: 75 IN | OXYGEN SATURATION: 97 % | SYSTOLIC BLOOD PRESSURE: 138 MMHG | TEMPERATURE: 99 F | BODY MASS INDEX: 29.84 KG/M2 | HEART RATE: 73 BPM | WEIGHT: 240 LBS

## 2023-11-27 VITALS — HEIGHT: 75 IN | BODY MASS INDEX: 29.84 KG/M2 | WEIGHT: 240 LBS

## 2023-11-27 DIAGNOSIS — J06.9 VIRAL URI WITH COUGH: Primary | ICD-10-CM

## 2023-11-27 DIAGNOSIS — R05.9 COUGH, UNSPECIFIED TYPE: ICD-10-CM

## 2023-11-27 DIAGNOSIS — S62.632D OPEN DISPLACED FRACTURE OF DISTAL PHALANX OF RIGHT MIDDLE FINGER WITH ROUTINE HEALING, SUBSEQUENT ENCOUNTER: Primary | ICD-10-CM

## 2023-11-27 DIAGNOSIS — S62.632D OPEN DISPLACED FRACTURE OF DISTAL PHALANX OF RIGHT MIDDLE FINGER WITH ROUTINE HEALING, SUBSEQUENT ENCOUNTER: ICD-10-CM

## 2023-11-27 LAB
CTP QC/QA: YES
CTP QC/QA: YES
POC MOLECULAR INFLUENZA A AGN: NEGATIVE
POC MOLECULAR INFLUENZA B AGN: NEGATIVE
SARS-COV-2 AG RESP QL IA.RAPID: NEGATIVE

## 2023-11-27 PROCEDURE — 1160F RVW MEDS BY RX/DR IN RCRD: CPT | Mod: CPTII,S$GLB,, | Performed by: PHYSICIAN ASSISTANT

## 2023-11-27 PROCEDURE — 73140 XR FINGER 2 OR MORE VIEWS RIGHT: ICD-10-PCS | Mod: 26,RT,, | Performed by: RADIOLOGY

## 2023-11-27 PROCEDURE — 1159F MED LIST DOCD IN RCRD: CPT | Mod: CPTII,S$GLB,, | Performed by: PHYSICIAN ASSISTANT

## 2023-11-27 PROCEDURE — 99999 PR PBB SHADOW E&M-EST. PATIENT-LVL III: CPT | Mod: PBBFAC,,, | Performed by: PHYSICIAN ASSISTANT

## 2023-11-27 PROCEDURE — 1159F PR MEDICATION LIST DOCUMENTED IN MEDICAL RECORD: ICD-10-PCS | Mod: CPTII,S$GLB,, | Performed by: PHYSICIAN ASSISTANT

## 2023-11-27 PROCEDURE — 87502 INFLUENZA DNA AMP PROBE: CPT | Mod: QW,S$GLB,, | Performed by: NURSE PRACTITIONER

## 2023-11-27 PROCEDURE — 87811 SARS CORONAVIRUS 2 ANTIGEN POCT, MANUAL READ: ICD-10-PCS | Mod: QW,S$GLB,, | Performed by: NURSE PRACTITIONER

## 2023-11-27 PROCEDURE — 73140 X-RAY EXAM OF FINGER(S): CPT | Mod: 26,RT,, | Performed by: RADIOLOGY

## 2023-11-27 PROCEDURE — 87502 POCT INFLUENZA A/B MOLECULAR: ICD-10-PCS | Mod: QW,S$GLB,, | Performed by: NURSE PRACTITIONER

## 2023-11-27 PROCEDURE — 1160F PR REVIEW ALL MEDS BY PRESCRIBER/CLIN PHARMACIST DOCUMENTED: ICD-10-PCS | Mod: CPTII,S$GLB,, | Performed by: PHYSICIAN ASSISTANT

## 2023-11-27 PROCEDURE — 99213 PR OFFICE/OUTPT VISIT, EST, LEVL III, 20-29 MIN: ICD-10-PCS | Mod: S$GLB,,, | Performed by: NURSE PRACTITIONER

## 2023-11-27 PROCEDURE — 99213 PR OFFICE/OUTPT VISIT, EST, LEVL III, 20-29 MIN: ICD-10-PCS | Mod: S$GLB,,, | Performed by: PHYSICIAN ASSISTANT

## 2023-11-27 PROCEDURE — 99213 OFFICE O/P EST LOW 20 MIN: CPT | Mod: S$GLB,,, | Performed by: PHYSICIAN ASSISTANT

## 2023-11-27 PROCEDURE — 99999 PR PBB SHADOW E&M-EST. PATIENT-LVL III: ICD-10-PCS | Mod: PBBFAC,,, | Performed by: PHYSICIAN ASSISTANT

## 2023-11-27 PROCEDURE — 87811 SARS-COV-2 COVID19 W/OPTIC: CPT | Mod: QW,S$GLB,, | Performed by: NURSE PRACTITIONER

## 2023-11-27 PROCEDURE — 73140 X-RAY EXAM OF FINGER(S): CPT | Mod: TC,PO,RT

## 2023-11-27 PROCEDURE — 99213 OFFICE O/P EST LOW 20 MIN: CPT | Mod: S$GLB,,, | Performed by: NURSE PRACTITIONER

## 2023-11-27 PROCEDURE — 3008F PR BODY MASS INDEX (BMI) DOCUMENTED: ICD-10-PCS | Mod: CPTII,S$GLB,, | Performed by: PHYSICIAN ASSISTANT

## 2023-11-27 PROCEDURE — 4010F ACE/ARB THERAPY RXD/TAKEN: CPT | Mod: CPTII,S$GLB,, | Performed by: PHYSICIAN ASSISTANT

## 2023-11-27 PROCEDURE — 3008F BODY MASS INDEX DOCD: CPT | Mod: CPTII,S$GLB,, | Performed by: PHYSICIAN ASSISTANT

## 2023-11-27 PROCEDURE — 4010F PR ACE/ARB THEARPY RXD/TAKEN: ICD-10-PCS | Mod: CPTII,S$GLB,, | Performed by: PHYSICIAN ASSISTANT

## 2023-11-27 RX ORDER — BENZONATATE 200 MG/1
200 CAPSULE ORAL 3 TIMES DAILY PRN
Qty: 30 CAPSULE | Refills: 0 | Status: SHIPPED | OUTPATIENT
Start: 2023-11-27 | End: 2024-03-18

## 2023-11-27 RX ORDER — PROMETHAZINE HYDROCHLORIDE AND DEXTROMETHORPHAN HYDROBROMIDE 6.25; 15 MG/5ML; MG/5ML
5 SYRUP ORAL NIGHTLY PRN
Qty: 118 ML | Refills: 0 | Status: SHIPPED | OUTPATIENT
Start: 2023-11-27 | End: 2024-03-18

## 2023-11-27 RX ORDER — GUAIFENESIN 600 MG/1
1200 TABLET, EXTENDED RELEASE ORAL 2 TIMES DAILY PRN
Qty: 30 TABLET | Refills: 0 | Status: SHIPPED | OUTPATIENT
Start: 2023-11-27

## 2023-11-27 NOTE — PROGRESS NOTES
"11/27/2023    HPI:  Singh Wan is a 43 y.o. male who is deaf and is currently using an [employer]  via the [employer], who presents to clinic today for continued evaluation of his  injury to the right middle finger with associated fracture and soft tissue injury.  He is approximately 4 weeks status post injury.  States overall he is doing very well.  States he does have some soreness of the finger.  States soreness on average is 3/10.  States pain is worse with activity and hitting the finger.  States pain is better with rest.  Denies any acute injuries since his last visit.  Denies any paresthesias.  Denies any other complaints at this time.    PMHX:  Past Medical History:   Diagnosis Date    a Bilateral Congenital Deafness     On Disability/Medicare For This; His Father Is Also Deaf    a H/O Atypical CP With 3/17/16 LCST = Normal     a Recurrent Palpitations     4/4/16 Referred To Dr. Mitch vazquez Family H/O DM     Depression     e Family H/O Thyroid Disorder     Hypertension     i 1 PPD X 15 YRs Tobacco Use     i PPD POS CXR NEG TXd 2010 With INH X 6 Months     3/11/16 "Stop Smoking"    j GERD     3/11/16 RXd Protonix 40 Mg Daily    l Chronic Low Back Pain     4/4/16 Referred To Dr. Zach roblero Right Hand/Wrist Neuropathy     n Anxiety And Depression     n Benzodiazepine Habituation     Until carissa Sees Psychiatry, No More Than 40 Xanax Tablets Per Month    q BLE Varicose Veins     3/11/16 Referred To Dr. Chivo Elliott    Wellness Visit 3/11/16        PSHX:  Past Surgical History:   Procedure Laterality Date    WRIST SURGERY Right        FMHX:  Family History   Problem Relation Age of Onset    Asthma Father     Lung disease Father        SOCHX:  Social History     Tobacco Use    Smoking status: Some Days     Current packs/day: 1.00     Average packs/day: 1 pack/day for 10.9 years (10.9 ttl pk-yrs)     Types: Cigarettes     Start date: 2013     Passive " exposure: Current    Smokeless tobacco: Never   Substance Use Topics    Alcohol use: Yes     Alcohol/week: 4.0 standard drinks of alcohol     Types: 4 Cans of beer per week       ALLERGIES:  Patient has no known allergies.    CURRENT MEDICATIONS:  Current Outpatient Medications on File Prior to Visit   Medication Sig Dispense Refill    acyclovir 5% (ZOVIRAX) 5 % ointment Apply topically 6 (six) times daily. (Patient not taking: Reported on 11/27/2023) 5 g 0    albuterol (VENTOLIN HFA) 90 mcg/actuation inhaler Inhale 2 puffs into the lungs every 6 (six) hours as needed for Wheezing. Rescue (Patient not taking: Reported on 11/27/2023) 18 g 0    alprazolam (XANAX) 1 MG tablet Take 1 tablet (1 mg total) by mouth 2 (two) times daily as needed for Anxiety. 40 tablet 1    cyclobenzaprine (FLEXERIL) 10 MG tablet Take 1 tablet (10 mg total) by mouth 3 (three) times daily as needed for Muscle spasms. 21 tablet 0    fluoxetine (PROZAC) 20 MG capsule Take 1 capsule by mouth Daily.      HYDROcodone-acetaminophen (NORCO) 5-325 mg per tablet Take 1 tablet by mouth every 6 (six) hours as needed for Pain. 20 tablet 0    hydrocodone-acetaminophen 5-325mg (NORCO) 5-325 mg per tablet Take 1 tablet by mouth every 4 (four) hours as needed for Pain. 18 tablet 0    ibuprofen (ADVIL,MOTRIN) 600 MG tablet Take 1 tablet (600 mg total) by mouth every 6 (six) hours as needed for Pain. (Patient not taking: Reported on 11/27/2023) 20 tablet 0    ipratropium (ATROVENT) 21 mcg (0.03 %) nasal spray 2 sprays by Each Nostril route 3 (three) times daily. 30 mL 0    losartan (COZAAR) 50 MG tablet Take 1 tablet by mouth Daily.      meloxicam (MOBIC) 15 MG tablet Take 1 tablet (15 mg total) by mouth daily as needed for Pain. (Patient not taking: Reported on 11/27/2023) 30 tablet 6    methylPREDNISolone (MEDROL DOSEPACK) 4 mg tablet Dispense one carlos. use as directed (Patient not taking: Reported on 11/27/2023) 1 each 0    mirtazapine (REMERON) 15 MG tablet  "Take 1 tablet by mouth Daily.      paroxetine (PAXIL) 20 MG tablet Take 1 tablet by mouth Daily.      promethazine (PHENERGAN) 6.25 mg/5 mL syrup 10mL at night as needed (Patient not taking: Reported on 11/27/2023) 120 mL 1    promethazine (PHENERGAN) 6.25 mg/5 mL syrup 10mL at night as needed (Patient not taking: Reported on 11/27/2023) 120 mL 1    promethazine (PHENERGAN) 6.25 mg/5 mL syrup 10mL at night as needed (Patient not taking: Reported on 11/27/2023) 120 mL 1    lisinopril (PRINIVIL,ZESTRIL) 5 MG tablet Take 1 tablet (5 mg total) by mouth once daily. 90 tablet 3    pantoprazole (PROTONIX) 40 MG tablet Take 1 tablet (40 mg total) by mouth once daily. 90 tablet 1     No current facility-administered medications on file prior to visit.       REVIEW OF SYSTEMS:  Review of Systems Complete; Negative, unless noted above.    GENERAL PHYSICAL EXAM:   Ht 6' 3" (1.905 m)   Wt 108.9 kg (240 lb)   BMI 30.00 kg/m²    GEN: well developed, well nourished, no acute distress   PULM: No wheezing, no respiratory distress   CV: RRR    ORTHO EXAM:   Examination of the right middle finger reveals an appropriately healing wound.  Presence of mild edema.  No erythema, ecchymosis, drainage, purulence, or other signs of infection.  Able to flex extend the finger appropriately, but with reduced range of motion.  Strength not tested secondary to injury.  Capillary refill less than 2 seconds.    RADIOLOGY:   X-rays of the right middle finger were taken today in clinic.  X-rays reviewed by myself.  Imaging showed the presence of a displaced volarly angulated oblique fracture of the distal portion of the distal phalanx with routine healing.  No significant change in position or alignment compared to previous imaging.  No other significant bony abnormalities noted.    ASSESSMENT:   Right middle finger  injury with associated open distal phalanx fracture and significant soft tissue trauma with routine healing    PLAN:  1. I " discussed with Singh Wan that he is progressing appropriately in the treatment course.  We discussed the best course of action this time is to transition to an Alumafoam extension splint of the right middle finger, as he states the Velcro splint is uncomfortable to wear.  We did discuss the importance of wearing the splint at all times only to remove for bathing and gentle range-of-motion exercises of the right middle finger as demonstrated in clinic.  We did discuss the importance of limited weight-bearing of the right hand/arm until seen again in clinic.  He verbally agreed with the treatment plan     2. I would like him follow-up in clinic in 2 weeks for repeat evaluation at which time we will perform repeat x-rays of the right middle finger.  He was instructed to contact clinic for any problems or concerns in the interim.

## 2023-11-27 NOTE — PATIENT INSTRUCTIONS

## 2023-11-27 NOTE — PROGRESS NOTES
"Subjective:      Patient ID: Singh Wan is a 43 y.o. male.    Vitals:  height is 6' 3" (1.905 m) and weight is 108.9 kg (240 lb). His temperature is 98.5 °F (36.9 °C). His blood pressure is 138/88 and his pulse is 73. His respiration is 20 and oxygen saturation is 97%.     Chief Complaint: Sinus Problem    43 year old male presents today with cough, HA, chest congestion, body aches, fatigue, cough is worse at night, chills, wheezing. No known exposure to anything. Positive COVID 2022. Not vaccinated for COVID or Flu. Symptoms started 11/22/2023. Treatments at home includes Tylenol.     Sinus Problem  This is a new problem. The current episode started in the past 7 days. The problem has been gradually worsening since onset. There has been no fever. Associated symptoms include chills, congestion, coughing, diaphoresis, headaches and sinus pressure. Pertinent negatives include no ear pain, hoarse voice, neck pain, shortness of breath, sneezing, sore throat or swollen glands. Past treatments include oral decongestants.       Constitution: Positive for chills and sweating. Negative for fatigue and fever.   HENT:  Positive for congestion and sinus pressure. Negative for ear pain, ear discharge, facial swelling and sore throat.    Neck: Negative for neck pain, neck stiffness and painful lymph nodes.   Cardiovascular: Negative.  Negative for chest pain and sob on exertion.   Eyes: Negative.  Negative for eye itching, eye pain and eye redness.   Respiratory:  Positive for cough. Negative for chest tightness, shortness of breath, wheezing and asthma.    Gastrointestinal: Negative.  Negative for abdominal pain, nausea and vomiting.   Endocrine: negative. excessive thirst.   Genitourinary: Negative.  Negative for dysuria, frequency, urgency and flank pain.   Musculoskeletal: Negative.  Negative for pain, trauma, joint pain and joint swelling.   Skin: Negative.  Negative for rash, wound, lesion and hives. "   Allergic/Immunologic: Negative.  Negative for eczema, asthma, hives, itching and sneezing.   Neurological:  Positive for headaches. Negative for dizziness, passing out, disorientation and altered mental status.   Hematologic/Lymphatic: Negative.  Negative for swollen lymph nodes.   Psychiatric/Behavioral: Negative.  Negative for altered mental status, disorientation and confusion.       Objective:     Physical Exam   Constitutional: He is oriented to person, place, and time. He appears well-developed. He is cooperative.  Non-toxic appearance. He does not appear ill. No distress.   HENT:   Head: Normocephalic and atraumatic.   Ears:   Right Ear: Hearing, tympanic membrane, external ear and ear canal normal. impacted cerumen  Left Ear: Hearing, tympanic membrane, external ear and ear canal normal. impacted cerumen  Nose: Nose normal. No mucosal edema, rhinorrhea, nasal deformity or congestion. No epistaxis. Right sinus exhibits no maxillary sinus tenderness and no frontal sinus tenderness. Left sinus exhibits no maxillary sinus tenderness and no frontal sinus tenderness.   Mouth/Throat: Uvula is midline and mucous membranes are normal. No trismus in the jaw. Normal dentition. No uvula swelling. Posterior oropharyngeal erythema present. No oropharyngeal exudate or posterior oropharyngeal edema.   Eyes: Conjunctivae and lids are normal. No scleral icterus.   Neck: Trachea normal and phonation normal. Neck supple. No edema present. No erythema present. No neck rigidity present.   Cardiovascular: Normal rate, regular rhythm, normal heart sounds and normal pulses.   Pulmonary/Chest: Effort normal and breath sounds normal. No stridor. No respiratory distress. He has no decreased breath sounds. He has no wheezes. He has no rhonchi. He has no rales. He exhibits no tenderness.   Abdominal: Normal appearance. There is no abdominal tenderness.   Musculoskeletal: Normal range of motion.         General: No deformity. Normal  range of motion.   Lymphadenopathy:     He has no cervical adenopathy.   Neurological: no focal deficit. He is alert, oriented to person, place, and time and at baseline. He exhibits normal muscle tone. Coordination normal.   Skin: Skin is warm, dry, intact, not diaphoretic and not pale.   Psychiatric: His speech is normal and behavior is normal. Mood, judgment and thought content normal.   Nursing note and vitals reviewed.  The following results have been reviewed with the patient:  LABS-  Results for orders placed or performed in visit on 11/27/23   SARS Coronavirus 2 Antigen, POCT Manual Read   Result Value Ref Range    SARS Coronavirus 2 Antigen Negative Negative     Acceptable Yes    POCT Influenza A/B MOLECULAR   Result Value Ref Range    POC Molecular Influenza A Ag Negative Negative, Not Reported    POC Molecular Influenza B Ag Negative Negative, Not Reported     Acceptable Yes             Assessment:     1. Viral URI with cough    2. Cough, unspecified type        Plan:   FOLLOWUP  Follow up if symptoms worsen or fail to improve, for PLEASE CONTACT PCP OR CONTACT THE EMERGENCY ROOM..     PATIENT INSTRUCTIONS  Patient Instructions   INSTRUCTIONS:  - Rest.  - Drink plenty of fluids.  - Take Tylenol and/or Ibuprofen as directed as needed for fever/pain.  Do not take more than the recommended dose.  - follow up with your PCP within the next 1-2 weeks as needed.  - You must understand that you have received an Urgent Care treatment only and that you may be released before all of your medical problems are known or treated.   - You, the patient, will arrange for follow up care as instructed.   - If your condition worsens or fails to improve we recommend that you receive another evaluation at the ER immediately or contact your PCP to discuss your concerns.   - You can call [phone] or [phone] to help schedule an appointment with the appropriate provider.     -If you smoke  cigarettes, it would be beneficial for you to stop.         THANK YOU FOR ALLOWING ME TO PARTICIPATE IN YOUR HEALTHCARE,     Macario Pinto, NP     Viral URI with cough  -     benzonatate (TESSALON) 200 MG capsule; Take 1 capsule (200 mg total) by mouth 3 (three) times daily as needed for Cough.  Dispense: 30 capsule; Refill: 0  -     promethazine-dextromethorphan (PROMETHAZINE-DM) 6.25-15 mg/5 mL Syrp; Take 5 mLs by mouth nightly as needed (cough).  Dispense: 118 mL; Refill: 0  -     guaiFENesin (MUCINEX) 600 mg 12 hr tablet; Take 2 tablets (1,200 mg total) by mouth 2 (two) times daily as needed for Congestion.  Dispense: 30 tablet; Refill: 0    Cough, unspecified type  -     SARS Coronavirus 2 Antigen, POCT Manual Read  -     POCT Influenza A/B MOLECULAR

## 2023-12-07 DIAGNOSIS — S62.632D OPEN DISPLACED FRACTURE OF DISTAL PHALANX OF RIGHT MIDDLE FINGER WITH ROUTINE HEALING, SUBSEQUENT ENCOUNTER: Primary | ICD-10-CM

## 2023-12-11 ENCOUNTER — HOSPITAL ENCOUNTER (OUTPATIENT)
Dept: RADIOLOGY | Facility: HOSPITAL | Age: 43
Discharge: HOME | End: 2023-12-11
Attending: PHYSICIAN ASSISTANT
Payer: MEDICARE

## 2023-12-11 ENCOUNTER — OFFICE VISIT (OUTPATIENT)
Dept: ORTHOPEDICS | Facility: CLINIC | Age: 43
End: 2023-12-11
Payer: MEDICARE

## 2023-12-11 DIAGNOSIS — S62.632D OPEN DISPLACED FRACTURE OF DISTAL PHALANX OF RIGHT MIDDLE FINGER WITH ROUTINE HEALING, SUBSEQUENT ENCOUNTER: ICD-10-CM

## 2023-12-11 DIAGNOSIS — S62.632D OPEN DISPLACED FRACTURE OF DISTAL PHALANX OF RIGHT MIDDLE FINGER WITH ROUTINE HEALING, SUBSEQUENT ENCOUNTER: Primary | ICD-10-CM

## 2023-12-11 PROCEDURE — 1160F RVW MEDS BY RX/DR IN RCRD: CPT | Mod: CPTII,S$GLB,, | Performed by: PHYSICIAN ASSISTANT

## 2023-12-11 PROCEDURE — 73140 X-RAY EXAM OF FINGER(S): CPT | Mod: TC,PO,RT

## 2023-12-11 PROCEDURE — 73140 X-RAY EXAM OF FINGER(S): CPT | Mod: 26,RT,, | Performed by: RADIOLOGY

## 2023-12-11 PROCEDURE — 99213 OFFICE O/P EST LOW 20 MIN: CPT | Mod: S$GLB,,, | Performed by: PHYSICIAN ASSISTANT

## 2023-12-11 PROCEDURE — 99999 PR PBB SHADOW E&M-EST. PATIENT-LVL III: CPT | Mod: PBBFAC,,, | Performed by: PHYSICIAN ASSISTANT

## 2023-12-11 PROCEDURE — 4010F PR ACE/ARB THEARPY RXD/TAKEN: ICD-10-PCS | Mod: CPTII,S$GLB,, | Performed by: PHYSICIAN ASSISTANT

## 2023-12-11 PROCEDURE — 4010F ACE/ARB THERAPY RXD/TAKEN: CPT | Mod: CPTII,S$GLB,, | Performed by: PHYSICIAN ASSISTANT

## 2023-12-11 PROCEDURE — 99999 PR PBB SHADOW E&M-EST. PATIENT-LVL III: ICD-10-PCS | Mod: PBBFAC,,, | Performed by: PHYSICIAN ASSISTANT

## 2023-12-11 PROCEDURE — 99213 PR OFFICE/OUTPT VISIT, EST, LEVL III, 20-29 MIN: ICD-10-PCS | Mod: S$GLB,,, | Performed by: PHYSICIAN ASSISTANT

## 2023-12-11 PROCEDURE — 73140 XR FINGER 2 OR MORE VIEWS RIGHT: ICD-10-PCS | Mod: 26,RT,, | Performed by: RADIOLOGY

## 2023-12-11 PROCEDURE — 1159F MED LIST DOCD IN RCRD: CPT | Mod: CPTII,S$GLB,, | Performed by: PHYSICIAN ASSISTANT

## 2023-12-11 PROCEDURE — 1159F PR MEDICATION LIST DOCUMENTED IN MEDICAL RECORD: ICD-10-PCS | Mod: CPTII,S$GLB,, | Performed by: PHYSICIAN ASSISTANT

## 2023-12-11 PROCEDURE — 1160F PR REVIEW ALL MEDS BY PRESCRIBER/CLIN PHARMACIST DOCUMENTED: ICD-10-PCS | Mod: CPTII,S$GLB,, | Performed by: PHYSICIAN ASSISTANT

## 2023-12-11 NOTE — PROGRESS NOTES
"12/11/2023    HPI:  Singh Wan is a 43 y.o. male he was deaf in his currently using an American  via of the [employer], who presents to clinic today for continued evaluation of his  injury to the right middle finger with associated soft tissue injury.  He is approximately 6 weeks status post injury.  States his wound has healed appropriately.  States he has noticed increased pain of the finger with the colder weather.  States pain can reach up to 7/10 if he hits or bumps of the finger.  Denies any acute injuries since his last visit.  Denies any other complaints at this time.    PMHX:  Past Medical History:   Diagnosis Date    a Bilateral Congenital Deafness     On Disability/Medicare For This; His Father Is Also Deaf    a H/O Atypical CP With 3/17/16 LCST = Normal     a Recurrent Palpitations     4/4/16 Referred To Dr. Mitch vazquez Family H/O DM     Depression     e Family H/O Thyroid Disorder     Hypertension     i 1 PPD X 15 YRs Tobacco Use     i PPD POS CXR NEG TXd 2010 With INH X 6 Months     3/11/16 "Stop Smoking"    j GERD     3/11/16 RXd Protonix 40 Mg Daily    l Chronic Low Back Pain     4/4/16 Referred To Dr. Zach roblero Right Hand/Wrist Neuropathy     n Anxiety And Depression     n Benzodiazepine Habituation     Until carissa Sees Psychiatry, No More Than 40 Xanax Tablets Per Month    q BLE Varicose Veins     3/11/16 Referred To Dr. Chivo Elliott    Wellness Visit 3/11/16        PSHX:  Past Surgical History:   Procedure Laterality Date    WRIST SURGERY Right        FMHX:  Family History   Problem Relation Age of Onset    Asthma Father     Lung disease Father        SOCHX:  Social History     Tobacco Use    Smoking status: Some Days     Current packs/day: 1.00     Average packs/day: 1 pack/day for 10.9 years (10.9 ttl pk-yrs)     Types: Cigarettes     Start date: 2013     Passive exposure: Current    Smokeless tobacco: Never   Substance Use Topics    " Alcohol use: Yes     Alcohol/week: 4.0 standard drinks of alcohol     Types: 4 Cans of beer per week       ALLERGIES:  Patient has no known allergies.    CURRENT MEDICATIONS:  Current Outpatient Medications on File Prior to Visit   Medication Sig Dispense Refill    acyclovir 5% (ZOVIRAX) 5 % ointment Apply topically 6 (six) times daily. 5 g 0    albuterol (VENTOLIN HFA) 90 mcg/actuation inhaler Inhale 2 puffs into the lungs every 6 (six) hours as needed for Wheezing. Rescue 18 g 0    alprazolam (XANAX) 1 MG tablet Take 1 tablet (1 mg total) by mouth 2 (two) times daily as needed for Anxiety. 40 tablet 1    benzonatate (TESSALON) 200 MG capsule Take 1 capsule (200 mg total) by mouth 3 (three) times daily as needed for Cough. 30 capsule 0    cyclobenzaprine (FLEXERIL) 10 MG tablet Take 1 tablet (10 mg total) by mouth 3 (three) times daily as needed for Muscle spasms. 21 tablet 0    fluoxetine (PROZAC) 20 MG capsule Take 1 capsule by mouth Daily.      guaiFENesin (MUCINEX) 600 mg 12 hr tablet Take 2 tablets (1,200 mg total) by mouth 2 (two) times daily as needed for Congestion. 30 tablet 0    HYDROcodone-acetaminophen (NORCO) 5-325 mg per tablet Take 1 tablet by mouth every 6 (six) hours as needed for Pain. 20 tablet 0    hydrocodone-acetaminophen 5-325mg (NORCO) 5-325 mg per tablet Take 1 tablet by mouth every 4 (four) hours as needed for Pain. 18 tablet 0    ibuprofen (ADVIL,MOTRIN) 600 MG tablet Take 1 tablet (600 mg total) by mouth every 6 (six) hours as needed for Pain. 20 tablet 0    ipratropium (ATROVENT) 21 mcg (0.03 %) nasal spray 2 sprays by Each Nostril route 3 (three) times daily. 30 mL 0    losartan (COZAAR) 50 MG tablet Take 1 tablet by mouth Daily.      meloxicam (MOBIC) 15 MG tablet Take 1 tablet (15 mg total) by mouth daily as needed for Pain. 30 tablet 6    methylPREDNISolone (MEDROL DOSEPACK) 4 mg tablet Dispense one carlos. use as directed 1 each 0    mirtazapine (REMERON) 15 MG tablet Take 1 tablet by  mouth Daily.      paroxetine (PAXIL) 20 MG tablet Take 1 tablet by mouth Daily.      promethazine (PHENERGAN) 6.25 mg/5 mL syrup 10mL at night as needed 120 mL 1    promethazine (PHENERGAN) 6.25 mg/5 mL syrup 10mL at night as needed 120 mL 1    promethazine (PHENERGAN) 6.25 mg/5 mL syrup 10mL at night as needed 120 mL 1    promethazine-dextromethorphan (PROMETHAZINE-DM) 6.25-15 mg/5 mL Syrp Take 5 mLs by mouth nightly as needed (cough). 118 mL 0    lisinopril (PRINIVIL,ZESTRIL) 5 MG tablet Take 1 tablet (5 mg total) by mouth once daily. 90 tablet 3    pantoprazole (PROTONIX) 40 MG tablet Take 1 tablet (40 mg total) by mouth once daily. 90 tablet 1     No current facility-administered medications on file prior to visit.       REVIEW OF SYSTEMS:  Review of Systems Complete; Negative, unless noted above.    GENERAL PHYSICAL EXAM:   There were no vitals taken for this visit.   GEN: well developed, well nourished, no acute distress   PULM: No wheezing, no respiratory distress   CV: RRR    ORTHO EXAM:   Examination of the right middle finger reveals a well-healed laceration.  No edema, erythema, ecchymosis, or skin breakdown noted.  Able to flex extend the finger appropriately at the MCP and PIP joint.  Presence of significantly reduced range of motion regards to flexion of the DIP joint of the right middle finger.  Strength not tested.  Sensation is grossly intact.  Capillary refill less than 2 seconds.    RADIOLOGY:   X-rays of the right middle finger were taken today in clinic.  X-rays reviewed by myself.  Imaging showed the presence of a displaced volarly angulated oblique fracture of the distal portion of the distal phalanx with no evidence of any significant healing when compared to the previous imaging.  This fracture may develop into a fibrous nonunion based off of all of his previous imaging.  No change in position or alignment compared to previous imaging.  No other significant bony abnormalities  noted.    ASSESSMENT:   Right middle finger  injury with associated open distal phalanx fracture with routine healing    PLAN:  1. I discussed with Singh Wan that the best course of action this time is to focus on D IP joint range of motion of the right middle finger.  He was instructed in shown in clinic today on how to perform the appropriate range-of-motion exercises/finger glides in clinic today.  We did discuss to wear the Alumafoam extension splint of the right middle finger for heavier activities only.  He verbalized understanding and verbally agreed with the treatment plan.      2. I would like him follow-up in clinic in 3 weeks for repeat evaluation at which time we will perform repeat x-rays of the right middle finger.  He was instructed to contact the clinic for any problems or concerns in the interim.

## 2023-12-26 DIAGNOSIS — S62.632D OPEN DISPLACED FRACTURE OF DISTAL PHALANX OF RIGHT MIDDLE FINGER WITH ROUTINE HEALING, SUBSEQUENT ENCOUNTER: Primary | ICD-10-CM

## 2024-01-02 ENCOUNTER — OFFICE VISIT (OUTPATIENT)
Dept: ORTHOPEDICS | Facility: CLINIC | Age: 44
End: 2024-01-02
Payer: MEDICARE

## 2024-01-02 ENCOUNTER — HOSPITAL ENCOUNTER (OUTPATIENT)
Dept: RADIOLOGY | Facility: HOSPITAL | Age: 44
Discharge: HOME | End: 2024-01-02
Attending: PHYSICIAN ASSISTANT
Payer: MEDICARE

## 2024-01-02 VITALS — BODY MASS INDEX: 29.84 KG/M2 | WEIGHT: 240 LBS | HEIGHT: 75 IN

## 2024-01-02 DIAGNOSIS — S62.632D OPEN DISPLACED FRACTURE OF DISTAL PHALANX OF RIGHT MIDDLE FINGER WITH ROUTINE HEALING, SUBSEQUENT ENCOUNTER: Primary | ICD-10-CM

## 2024-01-02 DIAGNOSIS — S62.632D OPEN DISPLACED FRACTURE OF DISTAL PHALANX OF RIGHT MIDDLE FINGER WITH ROUTINE HEALING, SUBSEQUENT ENCOUNTER: ICD-10-CM

## 2024-01-02 PROCEDURE — 99213 OFFICE O/P EST LOW 20 MIN: CPT | Mod: S$GLB,,, | Performed by: PHYSICIAN ASSISTANT

## 2024-01-02 PROCEDURE — 73140 X-RAY EXAM OF FINGER(S): CPT | Mod: 26,RT,, | Performed by: RADIOLOGY

## 2024-01-02 PROCEDURE — 73140 X-RAY EXAM OF FINGER(S): CPT | Mod: TC,PO,RT

## 2024-01-02 PROCEDURE — 99999 PR PBB SHADOW E&M-EST. PATIENT-LVL III: CPT | Mod: PBBFAC,,, | Performed by: PHYSICIAN ASSISTANT

## 2024-01-02 NOTE — PROGRESS NOTES
"1/2/2024    HPI:  Singh Wan is a 43 y.o. male who is deaf and is currently using an [employer]  via the [employer], who presents to clinic today for continued evaluation of his  injury to the right middle finger with associated soft tissue injury/fracture.  He is approximately 9 weeks status post injury.  States the nail plate continues to sloughed off appropriately.  States he is continues to have pain of the finger.  States he has tried to perform range of motion as instructed, but is unable to flex the tip of the finger.  Denies any other complaints at this time.    PMHX:  Past Medical History:   Diagnosis Date    a Bilateral Congenital Deafness     On Disability/Medicare For This; His Father Is Also Deaf    a H/O Atypical CP With 3/17/16 LCST = Normal     a Recurrent Palpitations     4/4/16 Referred To Dr. Mitch vazquez Family H/O DM     Depression     e Family H/O Thyroid Disorder     Hypertension     i 1 PPD X 15 YRs Tobacco Use     i PPD POS CXR NEG TXd 2010 With INH X 6 Months     3/11/16 "Stop Smoking"    j GERD     3/11/16 RXd Protonix 40 Mg Daily    l Chronic Low Back Pain     4/4/16 Referred To Dr. Zach roblero Right Hand/Wrist Neuropathy     n Anxiety And Depression     n Benzodiazepine Habituation     Until carissa Sees Psychiatry, No More Than 40 Xanax Tablets Per Month    q BLE Varicose Veins     3/11/16 Referred To Dr. Chivo Elliott    Wellness Visit 3/11/16        PSHX:  Past Surgical History:   Procedure Laterality Date    WRIST SURGERY Right        FMHX:  Family History   Problem Relation Age of Onset    Asthma Father     Lung disease Father        SOCHX:  Social History     Tobacco Use    Smoking status: Some Days     Current packs/day: 1.00     Average packs/day: 1 pack/day for 11.0 years (11.0 ttl pk-yrs)     Types: Cigarettes     Start date: 2013     Passive exposure: Current    Smokeless tobacco: Never   Substance Use Topics    Alcohol use: " Yes     Alcohol/week: 4.0 standard drinks of alcohol     Types: 4 Cans of beer per week       ALLERGIES:  Patient has no known allergies.    CURRENT MEDICATIONS:  Current Outpatient Medications on File Prior to Visit   Medication Sig Dispense Refill    acyclovir 5% (ZOVIRAX) 5 % ointment Apply topically 6 (six) times daily. 5 g 0    albuterol (VENTOLIN HFA) 90 mcg/actuation inhaler Inhale 2 puffs into the lungs every 6 (six) hours as needed for Wheezing. Rescue 18 g 0    alprazolam (XANAX) 1 MG tablet Take 1 tablet (1 mg total) by mouth 2 (two) times daily as needed for Anxiety. 40 tablet 1    benzonatate (TESSALON) 200 MG capsule Take 1 capsule (200 mg total) by mouth 3 (three) times daily as needed for Cough. 30 capsule 0    cyclobenzaprine (FLEXERIL) 10 MG tablet Take 1 tablet (10 mg total) by mouth 3 (three) times daily as needed for Muscle spasms. 21 tablet 0    fluoxetine (PROZAC) 20 MG capsule Take 1 capsule by mouth Daily.      guaiFENesin (MUCINEX) 600 mg 12 hr tablet Take 2 tablets (1,200 mg total) by mouth 2 (two) times daily as needed for Congestion. 30 tablet 0    HYDROcodone-acetaminophen (NORCO) 5-325 mg per tablet Take 1 tablet by mouth every 6 (six) hours as needed for Pain. 20 tablet 0    hydrocodone-acetaminophen 5-325mg (NORCO) 5-325 mg per tablet Take 1 tablet by mouth every 4 (four) hours as needed for Pain. 18 tablet 0    ibuprofen (ADVIL,MOTRIN) 600 MG tablet Take 1 tablet (600 mg total) by mouth every 6 (six) hours as needed for Pain. 20 tablet 0    ipratropium (ATROVENT) 21 mcg (0.03 %) nasal spray 2 sprays by Each Nostril route 3 (three) times daily. 30 mL 0    losartan (COZAAR) 50 MG tablet Take 1 tablet by mouth Daily.      meloxicam (MOBIC) 15 MG tablet Take 1 tablet (15 mg total) by mouth daily as needed for Pain. 30 tablet 6    methylPREDNISolone (MEDROL DOSEPACK) 4 mg tablet Dispense one carlos. use as directed 1 each 0    mirtazapine (REMERON) 15 MG tablet Take 1 tablet by mouth Daily.  "     paroxetine (PAXIL) 20 MG tablet Take 1 tablet by mouth Daily.      promethazine (PHENERGAN) 6.25 mg/5 mL syrup 10mL at night as needed 120 mL 1    promethazine (PHENERGAN) 6.25 mg/5 mL syrup 10mL at night as needed 120 mL 1    promethazine (PHENERGAN) 6.25 mg/5 mL syrup 10mL at night as needed 120 mL 1    promethazine-dextromethorphan (PROMETHAZINE-DM) 6.25-15 mg/5 mL Syrp Take 5 mLs by mouth nightly as needed (cough). 118 mL 0    lisinopril (PRINIVIL,ZESTRIL) 5 MG tablet Take 1 tablet (5 mg total) by mouth once daily. 90 tablet 3    pantoprazole (PROTONIX) 40 MG tablet Take 1 tablet (40 mg total) by mouth once daily. 90 tablet 1     No current facility-administered medications on file prior to visit.       REVIEW OF SYSTEMS:  Review of Systems Complete; Negative, unless noted above.    GENERAL PHYSICAL EXAM:   Ht 6' 3" (1.905 m)   Wt 108.9 kg (240 lb)   BMI 30.00 kg/m²    GEN: well developed, well nourished, no acute distress   PULM: No wheezing, no respiratory distress   CV: RRR    ORTHO EXAM:   Examination of the right middle finger reveals an appropriately healed laceration.  No edema, erythema, ecchymosis, or skin breakdown.  The nail plate continues to sloughed off appropriately, and is nearly off.  Able to flex the finger at the MCP and PIP joint, but unable to flex of the DIP joint.  5/5 /intrinsic strength.  Sensation is grossly intact of the right middle finger.  Capillary refill less than 2 seconds.    RADIOLOGY:   X-rays of the right middle finger were taken today in clinic.  X-rays reviewed by myself.  Imaging showed the presence of a displaced volarly angulated oblique fracture of the distal portion of the distal phalanx with no evidence of significant osseous bridging.  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  No other significant bony abnormalities noted.    ASSESSMENT:   Middle finger  injury with associated open distal " phalanx fracture with routine healing    PLAN:  1. I discussed with Singh Wan that the best course of action this time is to follow up with Dr. Gu to determine the need for any surgical intervention.  He verbally agreed with the treatment plan    2. I would like him follow-up in clinic in the next available appointment.  He was instructed to contact the clinic for any problems or concerns in the interim.

## 2024-01-18 DIAGNOSIS — S62.632D OPEN DISPLACED FRACTURE OF DISTAL PHALANX OF RIGHT MIDDLE FINGER WITH ROUTINE HEALING, SUBSEQUENT ENCOUNTER: Primary | ICD-10-CM

## 2024-01-22 ENCOUNTER — OFFICE VISIT (OUTPATIENT)
Dept: ORTHOPEDICS | Facility: CLINIC | Age: 44
End: 2024-01-22
Payer: MEDICARE

## 2024-01-22 ENCOUNTER — HOSPITAL ENCOUNTER (OUTPATIENT)
Dept: RADIOLOGY | Facility: HOSPITAL | Age: 44
Discharge: HOME | End: 2024-01-22
Attending: ORTHOPAEDIC SURGERY
Payer: MEDICARE

## 2024-01-22 VITALS — HEIGHT: 75 IN | WEIGHT: 240 LBS | BODY MASS INDEX: 29.84 KG/M2

## 2024-01-22 DIAGNOSIS — S62.632D OPEN DISPLACED FRACTURE OF DISTAL PHALANX OF RIGHT MIDDLE FINGER WITH ROUTINE HEALING, SUBSEQUENT ENCOUNTER: Primary | ICD-10-CM

## 2024-01-22 DIAGNOSIS — S62.632D OPEN DISPLACED FRACTURE OF DISTAL PHALANX OF RIGHT MIDDLE FINGER WITH ROUTINE HEALING, SUBSEQUENT ENCOUNTER: ICD-10-CM

## 2024-01-22 PROCEDURE — 99213 OFFICE O/P EST LOW 20 MIN: CPT | Mod: S$GLB,,, | Performed by: ORTHOPAEDIC SURGERY

## 2024-01-22 PROCEDURE — 73140 X-RAY EXAM OF FINGER(S): CPT | Mod: 26,RT,, | Performed by: RADIOLOGY

## 2024-01-22 PROCEDURE — 99999 PR PBB SHADOW E&M-EST. PATIENT-LVL III: CPT | Mod: PBBFAC,,, | Performed by: ORTHOPAEDIC SURGERY

## 2024-01-22 PROCEDURE — 73140 X-RAY EXAM OF FINGER(S): CPT | Mod: TC,PO,RT

## 2024-01-22 NOTE — PROGRESS NOTES
Mr Wan returns to clinic today.  Has a history of right middle finger open distal phalanx fracture.  He has continued to have difficulty with flexing the finger.  He is here today for further evaluation     Physical exam:  Examination of the right middle finger reveals that the wounds are all now well healed.  There is no significant edema.  Palpation only produces minimal tenderness.  He is able to flex at the PIP joint but he is unable to flex at the distal interphalangeal joint.  He is able to flex to the palm but not the distal palmar crease.  He does have capillary refill less than 2 seconds in the tip of the finger.    X-ray of the right middle finger was taken in clinic today.  There is noted be a fracture through the distal phalanx.  The fracture line is still visible.  The alignment is overall acceptable     Assessment:  Right middle finger distal phalanx fracture/laceration     Plan:    1. I have discussed treatment options with the patient.  I have discussed the possibility of continuing therapy.  I have also discussed the possibility of reconstruction of the profundus tendon.  At this time we will institute a course of therapy    2.  Will follow up with me in 8 weeks for repeat evaluation

## 2024-03-12 DIAGNOSIS — S62.632D OPEN DISPLACED FRACTURE OF DISTAL PHALANX OF RIGHT MIDDLE FINGER WITH ROUTINE HEALING, SUBSEQUENT ENCOUNTER: Primary | ICD-10-CM

## 2024-03-18 ENCOUNTER — OFFICE VISIT (OUTPATIENT)
Dept: URGENT CARE | Facility: CLINIC | Age: 44
End: 2024-03-18
Payer: MEDICARE

## 2024-03-18 VITALS
TEMPERATURE: 98 F | HEIGHT: 75 IN | RESPIRATION RATE: 22 BRPM | BODY MASS INDEX: 29.84 KG/M2 | HEART RATE: 65 BPM | SYSTOLIC BLOOD PRESSURE: 158 MMHG | OXYGEN SATURATION: 98 % | DIASTOLIC BLOOD PRESSURE: 93 MMHG | WEIGHT: 240 LBS

## 2024-03-18 DIAGNOSIS — J06.9 VIRAL URI WITH COUGH: Primary | ICD-10-CM

## 2024-03-18 DIAGNOSIS — H65.193 ACUTE EFFUSION OF BOTH MIDDLE EARS: ICD-10-CM

## 2024-03-18 PROCEDURE — 87502 INFLUENZA DNA AMP PROBE: CPT | Mod: QW,S$GLB,, | Performed by: PHYSICIAN ASSISTANT

## 2024-03-18 PROCEDURE — 99213 OFFICE O/P EST LOW 20 MIN: CPT | Mod: S$GLB,,, | Performed by: PHYSICIAN ASSISTANT

## 2024-03-18 PROCEDURE — 87811 SARS-COV-2 COVID19 W/OPTIC: CPT | Mod: QW,S$GLB,, | Performed by: PHYSICIAN ASSISTANT

## 2024-03-18 RX ORDER — BENZONATATE 200 MG/1
200 CAPSULE ORAL 3 TIMES DAILY PRN
Qty: 30 CAPSULE | Refills: 0 | Status: SHIPPED | OUTPATIENT
Start: 2024-03-18 | End: 2024-03-28

## 2024-03-18 RX ORDER — PROMETHAZINE HYDROCHLORIDE AND DEXTROMETHORPHAN HYDROBROMIDE 6.25; 15 MG/5ML; MG/5ML
5 SYRUP ORAL NIGHTLY PRN
Qty: 50 ML | Refills: 0 | Status: SHIPPED | OUTPATIENT
Start: 2024-03-18 | End: 2024-03-28

## 2024-03-18 RX ORDER — FLUTICASONE PROPIONATE 50 MCG
1 SPRAY, SUSPENSION (ML) NASAL DAILY
Qty: 16 G | Refills: 0 | Status: SHIPPED | OUTPATIENT
Start: 2024-03-18 | End: 2024-03-25

## 2024-03-18 RX ORDER — CETIRIZINE HYDROCHLORIDE 10 MG/1
10 TABLET ORAL DAILY
Qty: 14 TABLET | Refills: 0 | Status: SHIPPED | OUTPATIENT
Start: 2024-03-18 | End: 2024-04-01

## 2024-03-18 NOTE — PATIENT INSTRUCTIONS
URI   If your condition worsens or fails to improve we recommend that you receive another evaluation at the urgent care/ER immediately or contact your PCP to discuss your concerns. You must understand that you've received an urgent care treatment only and that you may be released before all your medical problems are known or treated. You the patient will arrange for follouwp care as instructed.     If we discussed that I think your illness is viral, it will not respond to antibiotics and will last 10-14 days.     Retest for covid at home in 48 hours  Use Tessalon as needed for cough during the day.  Use cough syrup at night this can be sedating please do not drink alcohol or drive with this medication.  Use Flonase for nasal congestion.  If you just have drainage you can take plain zyrtec, claritin or allegra     Rest and fluids are also important.     Salt water gargles, warm tea with honey and chloraseptic spray as needed for sore throat.   Tylenol or ibuprofen can also be used as directed for pain unless you have an allergy to them or medical condition such as stomach ulcers, kidney or liver disease or blood thinners etc for which you should not be taking these type of medications.

## 2024-03-18 NOTE — PROGRESS NOTES
"Subjective:      Patient ID: Singh Wan is a 43 y.o. male.    Vitals:  height is 6' 3" (1.905 m) and weight is 108.9 kg (240 lb). His oral temperature is 98.3 °F (36.8 °C). His blood pressure is 158/93 (abnormal) and his pulse is 65. His respiration is 22 (abnormal) and oxygen saturation is 98%.     Chief Complaint: Cough    Pt reports to clinic with cough, congestion, headache, fatigue, and nasal congestion onset yesterday. Pt using OTC cold medication for symptoms at home, patient is unsure if medications had decongestants in them.  No sick contacts.  Pain rated 5 out of 10, covid and flu test requested.  ALS  used [employer].    Cough  This is a new problem. The current episode started yesterday. The problem has been unchanged. The problem occurs constantly. The cough is Productive of sputum. Associated symptoms include chills, headaches, myalgias, nasal congestion and postnasal drip. Pertinent negatives include no chest pain, ear pain, fever, hemoptysis, rash or sore throat. Nothing aggravates the symptoms. He has tried OTC cough suppressant for the symptoms. The treatment provided no relief.       Constitution: Positive for chills. Negative for appetite change, sweating, fatigue and fever.   HENT:  Positive for congestion and postnasal drip. Negative for ear pain, ear discharge, tinnitus, hearing loss, dental problem, drooling, mouth sores, tongue pain, tongue lesion, sinus pain, sinus pressure, sore throat, trouble swallowing and voice change.    Neck: Negative for neck pain and neck stiffness.   Cardiovascular:  Negative for chest pain and sob on exertion.   Eyes:  Negative for eye discharge and eye itching.   Respiratory:  Positive for cough and sputum production. Negative for chest tightness and bloody sputum.         SOB with coughing fits   Gastrointestinal:  Negative for abdominal pain, nausea, vomiting, constipation and diarrhea.   Musculoskeletal:  Positive for muscle ache. " "Negative for muscle cramps.   Skin:  Negative for rash.   Neurological:  Positive for headaches. Negative for dizziness.      Past Medical History:   Diagnosis Date    a Bilateral Congenital Deafness     On Disability/Medicare For This; His Father Is Also Deaf    a H/O Atypical CP With 3/17/16 LCST = Normal     a Recurrent Palpitations     4/4/16 Referred To Dr. Mitch vazquez Family H/O DM     Depression     e Family H/O Thyroid Disorder     Hypertension     i 1 PPD X 15 YRs Tobacco Use     i PPD POS CXR NEG TXd 2010 With INH X 6 Months     3/11/16 "Stop Smoking"    j GERD     3/11/16 RXd Protonix 40 Mg Daily    l Chronic Low Back Pain     4/4/16 Referred To Dr. Zach roblero Right Hand/Wrist Neuropathy     n Anxiety And Depression     n Benzodiazepine Habituation     Until carissa Sees Psychiatry, No More Than 40 Xanax Tablets Per Month    q BLE Varicose Veins     3/11/16 Referred To Dr. Chivo Elliott    Wellness Visit 3/11/16        Past Surgical History:   Procedure Laterality Date    WRIST SURGERY Right        Family History   Problem Relation Age of Onset    Asthma Father     Lung disease Father        Social History     Socioeconomic History    Marital status:    Tobacco Use    Smoking status: Some Days     Current packs/day: 1.00     Average packs/day: 1 pack/day for 11.2 years (11.2 ttl pk-yrs)     Types: Cigarettes     Start date: 2013     Passive exposure: Current    Smokeless tobacco: Never   Substance and Sexual Activity    Alcohol use: Yes     Alcohol/week: 4.0 standard drinks of alcohol     Types: 4 Cans of beer per week    Drug use: No    Sexual activity: Yes   Social History Narrative    ** Merged History Encounter **            Current Outpatient Medications   Medication Sig Dispense Refill    acyclovir 5% (ZOVIRAX) 5 % ointment Apply topically 6 (six) times daily. 5 g 0    albuterol (VENTOLIN HFA) 90 mcg/actuation inhaler Inhale 2 puffs into the lungs every 6 (six) hours as needed " for Wheezing. Rescue 18 g 0    alprazolam (XANAX) 1 MG tablet Take 1 tablet (1 mg total) by mouth 2 (two) times daily as needed for Anxiety. 40 tablet 1    benzonatate (TESSALON) 200 MG capsule Take 1 capsule (200 mg total) by mouth 3 (three) times daily as needed for Cough. 30 capsule 0    cyclobenzaprine (FLEXERIL) 10 MG tablet Take 1 tablet (10 mg total) by mouth 3 (three) times daily as needed for Muscle spasms. 21 tablet 0    fluoxetine (PROZAC) 20 MG capsule Take 1 capsule by mouth Daily.      guaiFENesin (MUCINEX) 600 mg 12 hr tablet Take 2 tablets (1,200 mg total) by mouth 2 (two) times daily as needed for Congestion. 30 tablet 0    HYDROcodone-acetaminophen (NORCO) 5-325 mg per tablet Take 1 tablet by mouth every 6 (six) hours as needed for Pain. 20 tablet 0    hydrocodone-acetaminophen 5-325mg (NORCO) 5-325 mg per tablet Take 1 tablet by mouth every 4 (four) hours as needed for Pain. 18 tablet 0    ibuprofen (ADVIL,MOTRIN) 600 MG tablet Take 1 tablet (600 mg total) by mouth every 6 (six) hours as needed for Pain. 20 tablet 0    ipratropium (ATROVENT) 21 mcg (0.03 %) nasal spray 2 sprays by Each Nostril route 3 (three) times daily. 30 mL 0    losartan (COZAAR) 50 MG tablet Take 1 tablet by mouth Daily.      meloxicam (MOBIC) 15 MG tablet Take 1 tablet (15 mg total) by mouth daily as needed for Pain. 30 tablet 6    mirtazapine (REMERON) 15 MG tablet Take 1 tablet by mouth Daily.      paroxetine (PAXIL) 20 MG tablet Take 1 tablet by mouth Daily.      promethazine (PHENERGAN) 6.25 mg/5 mL syrup 10mL at night as needed 120 mL 1    promethazine (PHENERGAN) 6.25 mg/5 mL syrup 10mL at night as needed 120 mL 1    promethazine (PHENERGAN) 6.25 mg/5 mL syrup 10mL at night as needed 120 mL 1    lisinopril (PRINIVIL,ZESTRIL) 5 MG tablet Take 1 tablet (5 mg total) by mouth once daily. 90 tablet 3    pantoprazole (PROTONIX) 40 MG tablet Take 1 tablet (40 mg total) by mouth once daily. 90 tablet 1     No current  facility-administered medications for this visit.       Review of patient's allergies indicates:  No Known Allergies    Objective:     Physical Exam   Constitutional: He is oriented to person, place, and time. He appears well-developed. He is cooperative.  Non-toxic appearance. He does not appear ill. No distress.   HENT:   Head: Normocephalic and atraumatic.   Ears:   Right Ear: Hearing, external ear and ear canal normal. Tympanic membrane is not injected, not erythematous, not retracted and not bulging. A middle ear effusion is present. impacted cerumen  Left Ear: Hearing, external ear and ear canal normal. Tympanic membrane is not injected, not erythematous, not retracted and not bulging. A middle ear effusion is present. impacted cerumen  Nose: Rhinorrhea and congestion present. No mucosal edema or nasal deformity. No epistaxis. Right sinus exhibits no maxillary sinus tenderness and no frontal sinus tenderness. Left sinus exhibits no maxillary sinus tenderness and no frontal sinus tenderness.   Mouth/Throat: Uvula is midline and mucous membranes are normal. Mucous membranes are moist. No trismus in the jaw. Normal dentition. No uvula swelling. Posterior oropharyngeal erythema present. No oropharyngeal exudate or posterior oropharyngeal edema.   Eyes: Conjunctivae and lids are normal. Right eye exhibits no discharge. Left eye exhibits no discharge. No scleral icterus.   Neck: Trachea normal and phonation normal. Neck supple. No edema present. No erythema present. No neck rigidity present.   Cardiovascular: Normal rate, regular rhythm, normal heart sounds and normal pulses.   No murmur heard.Exam reveals no gallop and no friction rub.   Pulmonary/Chest: Effort normal and breath sounds normal. No stridor. No respiratory distress. He has no decreased breath sounds. He has no wheezes. He has no rhonchi. He has no rales.   Abdominal: Normal appearance.   Musculoskeletal:      Cervical back: He exhibits no tenderness.    Lymphadenopathy:     He has no cervical adenopathy.   Neurological: He is alert and oriented to person, place, and time. He exhibits normal muscle tone.   Skin: Skin is warm, dry, intact, not diaphoretic, not pale and no rash.   Psychiatric: His speech is normal and behavior is normal. Mood, judgment and thought content normal.   Nursing note and vitals reviewed.    Results for orders placed or performed in visit on 03/18/24   SARS Coronavirus 2 Antigen, POCT Manual Read   Result Value Ref Range    SARS Coronavirus 2 Antigen Negative Negative     Acceptable Yes    POCT Influenza A/B MOLECULAR   Result Value Ref Range    POC Molecular Influenza A Ag Negative Negative, Not Reported    POC Molecular Influenza B Ag Negative Negative, Not Reported     Acceptable Yes        Assessment:     1. Viral URI with cough    2. Acute effusion of both middle ears        Plan:       Viral URI with cough  -     SARS Coronavirus 2 Antigen, POCT Manual Read  -     POCT Influenza A/B MOLECULAR  -     benzonatate (TESSALON) 200 MG capsule; Take 1 capsule (200 mg total) by mouth 3 (three) times daily as needed for Cough.  Dispense: 30 capsule; Refill: 0  -     promethazine-dextromethorphan (PROMETHAZINE-DM) 6.25-15 mg/5 mL Syrp; Take 5 mLs by mouth nightly as needed (cough).  Dispense: 50 mL; Refill: 0  -     fluticasone propionate (FLONASE) 50 mcg/actuation nasal spray; 1 spray (50 mcg total) by Each Nostril route once daily. for 7 days  Dispense: 16 g; Refill: 0  -     cetirizine (ZYRTEC) 10 MG tablet; Take 1 tablet (10 mg total) by mouth once daily. for 14 days  Dispense: 14 tablet; Refill: 0    Acute effusion of both middle ears  -     cetirizine (ZYRTEC) 10 MG tablet; Take 1 tablet (10 mg total) by mouth once daily. for 14 days  Dispense: 14 tablet; Refill: 0    Results reviewed  I have reviewed the patient chart and pertinent past imaging/labs.      Patient Instructions                                                             URI   If your condition worsens or fails to improve we recommend that you receive another evaluation at the urgent care/ER immediately or contact your PCP to discuss your concerns. You must understand that you've received an urgent care treatment only and that you may be released before all your medical problems are known or treated. You the patient will arrange for follouwp care as instructed.     If we discussed that I think your illness is viral, it will not respond to antibiotics and will last 10-14 days.     Retest for covid at home in 48 hours  Use Tessalon as needed for cough during the day.  Use cough syrup at night this can be sedating please do not drink alcohol or drive with this medication.  Use Flonase for nasal congestion.  If you just have drainage you can take plain zyrtec, claritin or allegra     Rest and fluids are also important.     Salt water gargles, warm tea with honey and chloraseptic spray as needed for sore throat.   Tylenol or ibuprofen can also be used as directed for pain unless you have an allergy to them or medical condition such as stomach ulcers, kidney or liver disease or blood thinners etc for which you should not be taking these type of medications.